# Patient Record
Sex: FEMALE | Race: WHITE | Employment: FULL TIME | URBAN - METROPOLITAN AREA
[De-identification: names, ages, dates, MRNs, and addresses within clinical notes are randomized per-mention and may not be internally consistent; named-entity substitution may affect disease eponyms.]

---

## 2017-09-02 ENCOUNTER — HOSPITAL ENCOUNTER (EMERGENCY)
Age: 42
Discharge: LEFT BEFORE DISCHARGE | End: 2017-09-02
Attending: EMERGENCY MEDICINE
Payer: MEDICAID

## 2017-09-02 VITALS
OXYGEN SATURATION: 95 % | TEMPERATURE: 98.6 F | RESPIRATION RATE: 18 BRPM | SYSTOLIC BLOOD PRESSURE: 142 MMHG | HEART RATE: 101 BPM | DIASTOLIC BLOOD PRESSURE: 73 MMHG

## 2017-09-02 PROCEDURE — 99281 EMR DPT VST MAYX REQ PHY/QHP: CPT | Performed by: EMERGENCY MEDICINE

## 2017-09-03 NOTE — ED NOTES
Went to bedside to attempt to discharge patient, and patient was not in the room. Vital Signs were not able to be obtained for discharge due to the patient's abscence. Patient did not receive discharge instructions and/or prescriptions.

## 2017-11-22 ENCOUNTER — HOSPITAL ENCOUNTER (EMERGENCY)
Age: 42
Discharge: HOME | End: 2017-11-23
Attending: EMERGENCY MEDICINE
Payer: MEDICAID

## 2017-11-22 ENCOUNTER — APPOINTMENT (OUTPATIENT)
Dept: GENERAL RADIOLOGY | Age: 42
End: 2017-11-22
Attending: EMERGENCY MEDICINE
Payer: MEDICAID

## 2017-11-22 DIAGNOSIS — J20.9 ACUTE BRONCHITIS, UNSPECIFIED ORGANISM: Primary | ICD-10-CM

## 2017-11-22 LAB
ALBUMIN SERPL-MCNC: 3.4 G/DL (ref 3.5–5)
ALBUMIN/GLOB SERPL: 0.9 {RATIO} (ref 1.2–3.5)
ALP SERPL-CCNC: 92 U/L (ref 50–136)
ALT SERPL-CCNC: 23 U/L (ref 12–65)
ANION GAP SERPL CALC-SCNC: 11 MMOL/L (ref 7–16)
AST SERPL-CCNC: 20 U/L (ref 15–37)
BILIRUB SERPL-MCNC: 0.2 MG/DL (ref 0.2–1.1)
BUN SERPL-MCNC: 17 MG/DL (ref 6–23)
CALCIUM SERPL-MCNC: 8.7 MG/DL (ref 8.3–10.4)
CHLORIDE SERPL-SCNC: 103 MMOL/L (ref 98–107)
CO2 SERPL-SCNC: 29 MMOL/L (ref 21–32)
CREAT SERPL-MCNC: 1.08 MG/DL (ref 0.6–1)
ERYTHROCYTE [DISTWIDTH] IN BLOOD BY AUTOMATED COUNT: 13.6 % (ref 11.9–14.6)
GLOBULIN SER CALC-MCNC: 3.6 G/DL (ref 2.3–3.5)
GLUCOSE SERPL-MCNC: 128 MG/DL (ref 65–100)
HCT VFR BLD AUTO: 41.4 % (ref 35.8–46.3)
HGB BLD-MCNC: 13.7 G/DL (ref 11.7–15.4)
MCH RBC QN AUTO: 29 PG (ref 26.1–32.9)
MCHC RBC AUTO-ENTMCNC: 33.1 G/DL (ref 31.4–35)
MCV RBC AUTO: 87.7 FL (ref 79.6–97.8)
PLATELET # BLD AUTO: 231 K/UL (ref 150–450)
PMV BLD AUTO: 11.4 FL (ref 10.8–14.1)
POTASSIUM SERPL-SCNC: 3.3 MMOL/L (ref 3.5–5.1)
PROT SERPL-MCNC: 7 G/DL (ref 6.3–8.2)
RBC # BLD AUTO: 4.72 M/UL (ref 4.05–5.25)
SODIUM SERPL-SCNC: 143 MMOL/L (ref 136–145)
WBC # BLD AUTO: 7.4 K/UL (ref 4.3–11.1)

## 2017-11-22 PROCEDURE — 71010 XR CHEST PORT: CPT

## 2017-11-22 PROCEDURE — 93005 ELECTROCARDIOGRAM TRACING: CPT | Performed by: EMERGENCY MEDICINE

## 2017-11-22 PROCEDURE — 83880 ASSAY OF NATRIURETIC PEPTIDE: CPT | Performed by: EMERGENCY MEDICINE

## 2017-11-22 PROCEDURE — 99284 EMERGENCY DEPT VISIT MOD MDM: CPT | Performed by: EMERGENCY MEDICINE

## 2017-11-22 PROCEDURE — 80053 COMPREHEN METABOLIC PANEL: CPT | Performed by: EMERGENCY MEDICINE

## 2017-11-22 PROCEDURE — 74011250637 HC RX REV CODE- 250/637: Performed by: EMERGENCY MEDICINE

## 2017-11-22 PROCEDURE — 85027 COMPLETE CBC AUTOMATED: CPT | Performed by: EMERGENCY MEDICINE

## 2017-11-22 RX ORDER — CLONIDINE HYDROCHLORIDE 0.2 MG/1
TABLET ORAL 2 TIMES DAILY
COMMUNITY

## 2017-11-22 RX ORDER — LISINOPRIL 40 MG/1
40 TABLET ORAL DAILY
COMMUNITY

## 2017-11-22 RX ORDER — SERTRALINE HYDROCHLORIDE 100 MG/1
TABLET, FILM COATED ORAL DAILY
COMMUNITY

## 2017-11-22 RX ORDER — CLONAZEPAM 1 MG/1
TABLET ORAL 2 TIMES DAILY
COMMUNITY

## 2017-11-22 RX ORDER — TRAMADOL HYDROCHLORIDE 50 MG/1
50 TABLET ORAL
Status: COMPLETED | OUTPATIENT
Start: 2017-11-22 | End: 2017-11-22

## 2017-11-22 RX ORDER — PROPRANOLOL HYDROCHLORIDE 80 MG/1
80 TABLET ORAL 3 TIMES DAILY
COMMUNITY

## 2017-11-22 RX ORDER — FUROSEMIDE 20 MG/1
TABLET ORAL DAILY
COMMUNITY

## 2017-11-22 RX ORDER — ATORVASTATIN CALCIUM 40 MG/1
TABLET, FILM COATED ORAL DAILY
COMMUNITY

## 2017-11-22 RX ORDER — VERAPAMIL HYDROCHLORIDE 120 MG/1
120 TABLET, FILM COATED ORAL 3 TIMES DAILY
COMMUNITY

## 2017-11-22 RX ADMIN — TRAMADOL HYDROCHLORIDE 50 MG: 50 TABLET, FILM COATED ORAL at 23:42

## 2017-11-22 NOTE — LETTER
[facility] EMERGENCY DEPT 
[address] [place] [id number] 
[phone] Work/School Note Date: 11/22/2017 To Whom It May concern: 
 
Aydee Oropeza was seen and treated today in the emergency room by the following provider(s): 
Attending Provider: Rosetta Duarte MD.   
 
Aydee Oropeza may return to work on Friday 11/24/17 Sincerely, Kami Zabala RN

## 2017-11-23 VITALS
DIASTOLIC BLOOD PRESSURE: 90 MMHG | WEIGHT: 223 LBS | TEMPERATURE: 98.5 F | RESPIRATION RATE: 18 BRPM | BODY MASS INDEX: 44.96 KG/M2 | OXYGEN SATURATION: 95 % | SYSTOLIC BLOOD PRESSURE: 185 MMHG | HEIGHT: 59 IN | HEART RATE: 95 BPM

## 2017-11-23 LAB
ATRIAL RATE: 96 BPM
BNP SERPL-MCNC: 106 PG/ML
CALCULATED P AXIS, ECG09: 53 DEGREES
CALCULATED R AXIS, ECG10: 69 DEGREES
CALCULATED T AXIS, ECG11: -100 DEGREES
DIAGNOSIS, 93000: NORMAL
P-R INTERVAL, ECG05: 124 MS
Q-T INTERVAL, ECG07: 328 MS
QRS DURATION, ECG06: 82 MS
QTC CALCULATION (BEZET), ECG08: 414 MS
VENTRICULAR RATE, ECG03: 96 BPM

## 2017-11-23 RX ORDER — BENZONATATE 100 MG/1
100-200 CAPSULE ORAL
Qty: 14 CAP | Refills: 0 | Status: SHIPPED | OUTPATIENT
Start: 2017-11-23 | End: 2017-11-30

## 2017-11-23 RX ORDER — CIPROFLOXACIN 500 MG/1
500 TABLET ORAL 2 TIMES DAILY
Qty: 14 TAB | Refills: 0 | Status: SHIPPED | OUTPATIENT
Start: 2017-11-23 | End: 2017-11-30

## 2017-11-23 NOTE — ED PROVIDER NOTES
HPI Comments: 49-year-old lady with a history of potential CHF who presents with concerns about some shortness of breath and some leg swelling. Patient says that she has had no event or chills. She says she has been taking her Lasix and has not missed any doses. She says for she notes she does not have any significant history of COPD. Patient was admitted to the [address] [place] [address] about a month and a half ago for shortness of breath issues and had a nuclear medicine stress test at that time. That was barely negative. Patient says with her shortness of breath she has not had a productive cough. She says she does have some overall chest soreness but no specific pain and says she is not having any heaviness or tightness. Elements of this note were created using speech recognition software. As such, errors of speech recognition may be present. The history is provided by the patient. Past Medical History:   Diagnosis Date    Heart failure ([address]     Hypertension     Psychiatric disorder     anxiety and depression       Past Surgical History:   Procedure Laterality Date    HX CHOLECYSTECTOMY      HX TUBAL LIGATION      c section times 2         History reviewed. No pertinent family history. Social History     Social History    Marital status:      Spouse name: N/A    Number of children: N/A    Years of education: N/A     Occupational History    Not on file. Social History Main Topics    Smoking status: Current Every Day Smoker     Packs/day: 1.00    Smokeless tobacco: Never Used    Alcohol use No    Drug use: No    Sexual activity: Yes     Birth control/ protection: None, Surgical     Other Topics Concern    Not on file     Social History Narrative         ALLERGIES: Meperidine; Nsaids (non-steroidal anti-inflammatory drug); Other medication; and Sulfa (sulfonamide antibiotics)    Review of Systems   Constitutional: Negative for chills, diaphoresis and fever. HENT: Negative for congestion, rhinorrhea and sore throat. Eyes: Negative for redness and visual disturbance. Respiratory: Positive for cough and shortness of breath. Negative for chest tightness and wheezing. Cardiovascular: Positive for leg swelling. Negative for chest pain and palpitations. Gastrointestinal: Negative for abdominal pain, blood in stool, diarrhea, nausea and vomiting. Endocrine: Negative for polydipsia and polyuria. Genitourinary: Negative for dysuria and hematuria. Musculoskeletal: Negative for arthralgias, myalgias and neck stiffness. Skin: Negative for rash. Allergic/Immunologic: Negative for environmental allergies and food allergies. Neurological: Negative for dizziness, weakness and headaches. Hematological: Negative for adenopathy. Does not bruise/bleed easily. Psychiatric/Behavioral: Negative for confusion and sleep disturbance. The patient is not nervous/anxious. Vitals:    11/22/17 2250   BP: (!) 187/102   Pulse: 98   Resp: 18   Temp: 98.5 °F (36.9 °C)   SpO2: 96%   Weight: 101.2 kg (223 lb)   Height: 4' 11\" (1.499 m)            Physical Exam   Constitutional: She is oriented to person, place, and time. She appears well-developed and well-nourished. HENT:   Head: Normocephalic and atraumatic. Eyes: Conjunctivae and EOM are normal. Pupils are equal, round, and reactive to light. Neck: Normal range of motion. Cardiovascular: Normal rate and regular rhythm. Pulmonary/Chest: Effort normal and breath sounds normal. No respiratory distress. She has no wheezes. She has no rales. She exhibits no tenderness. No significant wheezing or rales   Abdominal: Soft. Bowel sounds are normal. There is no rebound and no guarding. Musculoskeletal: Normal range of motion. She exhibits edema. She exhibits no tenderness. 1+ pitting edema   Lymphadenopathy:     She has no cervical adenopathy. Neurological: She is alert and oriented to person, place, and time. Skin: Skin is warm and dry. Psychiatric: She has a normal mood and affect. Nursing note and vitals reviewed. MDM  Number of Diagnoses or Management Options  Diagnosis management comments: Result Narrative  · Resting ECG: normal. Except for non-specific T Wave abnormalities. · Stress ECG: non diagnostic. · Protocol: Pharmacologic using regadenoson  · Hemodynamic Response: adequate. · Symptoms: chest pain, nausea and shortness of breath. Chest pain 9/10;   persisting so gave SL NTG with improvement and resolution in 8-10min. · LV Function: EF is 57%. LV function is normal. The wall motion is   normal.  · Left ventricle cavity size is normal.  · Left ventricular perfusion: normal.  · Findings consistent with no acute reversible nyocardial ischemia seen. .      I will check an x-ray  As well as some basic blood work. Patient's x-ray and blood work essentially unremarkable. I will plan to treat her for a bronchitis given her significant smoking history.     ED Course       Procedures

## 2017-11-23 NOTE — DISCHARGE INSTRUCTIONS
Return with any fevers, vomiting, difficulty breathing, worsening symptoms, or additional concerns. Follow-up with your primary care doctor for reevaluation in 5-7 days.

## 2017-11-23 NOTE — ED NOTES
I have reviewed discharge instructions with the patient. The patient verbalized understanding. Patient left ED via Discharge Method: ambulatory to Home with     Opportunity for questions and clarification provided. Patient given 1 scripts. To continue your aftercare when you leave the hospital, you may receive an automated call from our care team to check in on how you are doing. This is a free service and part of our promise to provide the best care and service to meet your aftercare needs.  If you have questions, or wish to unsubscribe from this service please call [phone]. Thank you for Choosing our [facility] Emergency Department.

## 2017-11-23 NOTE — ED TRIAGE NOTES
Pt.states her lasix is not working and she is swelling and short of breath. Pt.has pitting edema in legs and pt.smells of cigarettes;states \"it was smoke or kill someone. \"Pt.states she has a headache and floaters around eyes.

## 2022-01-19 ENCOUNTER — HOSPITAL ENCOUNTER (EMERGENCY)
Age: 47
Discharge: LEFT BEFORE DISCHARGE | End: 2022-01-19
Attending: EMERGENCY MEDICINE

## 2022-01-19 ENCOUNTER — APPOINTMENT (OUTPATIENT)
Dept: GENERAL RADIOLOGY | Age: 47
End: 2022-01-19
Attending: EMERGENCY MEDICINE

## 2022-01-19 VITALS
BODY MASS INDEX: 47.17 KG/M2 | WEIGHT: 234 LBS | DIASTOLIC BLOOD PRESSURE: 117 MMHG | HEART RATE: 121 BPM | HEIGHT: 59 IN | RESPIRATION RATE: 20 BRPM | SYSTOLIC BLOOD PRESSURE: 207 MMHG | TEMPERATURE: 97.7 F | OXYGEN SATURATION: 98 %

## 2022-01-19 LAB
ALBUMIN SERPL-MCNC: 3.4 G/DL (ref 3.5–5)
ALBUMIN/GLOB SERPL: 0.9 {RATIO} (ref 1.2–3.5)
ALP SERPL-CCNC: 118 U/L (ref 50–136)
ALT SERPL-CCNC: 57 U/L (ref 12–65)
ANION GAP SERPL CALC-SCNC: 6 MMOL/L (ref 7–16)
AST SERPL-CCNC: 29 U/L (ref 15–37)
BASOPHILS # BLD: 0 K/UL (ref 0–0.2)
BASOPHILS NFR BLD: 0 % (ref 0–2)
BILIRUB SERPL-MCNC: 0.4 MG/DL (ref 0.2–1.1)
BNP SERPL-MCNC: 2528 PG/ML (ref 5–125)
BUN SERPL-MCNC: 17 MG/DL (ref 6–23)
CALCIUM SERPL-MCNC: 8.7 MG/DL (ref 8.3–10.4)
CHLORIDE SERPL-SCNC: 107 MMOL/L (ref 98–107)
CO2 SERPL-SCNC: 27 MMOL/L (ref 21–32)
CREAT SERPL-MCNC: 0.83 MG/DL (ref 0.6–1)
CRP SERPL HS-MCNC: 33.2 MG/L
D DIMER PPP FEU-MCNC: 1.2 UG/ML(FEU)
DIFFERENTIAL METHOD BLD: ABNORMAL
EOSINOPHIL # BLD: 0 K/UL (ref 0–0.8)
EOSINOPHIL NFR BLD: 0 % (ref 0.5–7.8)
ERYTHROCYTE [DISTWIDTH] IN BLOOD BY AUTOMATED COUNT: 14.6 % (ref 11.9–14.6)
GLOBULIN SER CALC-MCNC: 4 G/DL (ref 2.3–3.5)
GLUCOSE SERPL-MCNC: 112 MG/DL (ref 65–100)
HCT VFR BLD AUTO: 50 % (ref 35.8–46.3)
HGB BLD-MCNC: 16.3 G/DL (ref 11.7–15.4)
IMM GRANULOCYTES # BLD AUTO: 0 K/UL (ref 0–0.5)
IMM GRANULOCYTES NFR BLD AUTO: 0 % (ref 0–5)
LYMPHOCYTES # BLD: 0.3 K/UL (ref 0.5–4.6)
LYMPHOCYTES NFR BLD: 4 % (ref 13–44)
MAGNESIUM SERPL-MCNC: 2.5 MG/DL (ref 1.8–2.4)
MCH RBC QN AUTO: 28.4 PG (ref 26.1–32.9)
MCHC RBC AUTO-ENTMCNC: 32.6 G/DL (ref 31.4–35)
MCV RBC AUTO: 87.3 FL (ref 79.6–97.8)
MONOCYTES # BLD: 0.3 K/UL (ref 0.1–1.3)
MONOCYTES NFR BLD: 3 % (ref 4–12)
NEUTS SEG # BLD: 8.6 K/UL (ref 1.7–8.2)
NEUTS SEG NFR BLD: 93 % (ref 43–78)
NRBC # BLD: 0 K/UL (ref 0–0.2)
PLATELET # BLD AUTO: 217 K/UL (ref 150–450)
PMV BLD AUTO: 10.8 FL (ref 9.4–12.3)
POTASSIUM SERPL-SCNC: 3.8 MMOL/L (ref 3.5–5.1)
PROCALCITONIN SERPL-MCNC: 0.22 NG/ML (ref 0–0.49)
PROT SERPL-MCNC: 7.4 G/DL (ref 6.3–8.2)
RBC # BLD AUTO: 5.73 M/UL (ref 4.05–5.2)
SODIUM SERPL-SCNC: 140 MMOL/L (ref 136–145)
TROPONIN-HIGH SENSITIVITY: 22.9 PG/ML (ref 0–14)
WBC # BLD AUTO: 9.3 K/UL (ref 4.3–11.1)

## 2022-01-19 PROCEDURE — 80053 COMPREHEN METABOLIC PANEL: CPT

## 2022-01-19 PROCEDURE — 84484 ASSAY OF TROPONIN QUANT: CPT

## 2022-01-19 PROCEDURE — 93005 ELECTROCARDIOGRAM TRACING: CPT | Performed by: EMERGENCY MEDICINE

## 2022-01-19 PROCEDURE — 83880 ASSAY OF NATRIURETIC PEPTIDE: CPT

## 2022-01-19 PROCEDURE — 85025 COMPLETE CBC W/AUTO DIFF WBC: CPT

## 2022-01-19 PROCEDURE — 75810000275 HC EMERGENCY DEPT VISIT NO LEVEL OF CARE

## 2022-01-19 PROCEDURE — 71045 X-RAY EXAM CHEST 1 VIEW: CPT

## 2022-01-19 PROCEDURE — 84145 PROCALCITONIN (PCT): CPT

## 2022-01-19 PROCEDURE — 85379 FIBRIN DEGRADATION QUANT: CPT

## 2022-01-19 PROCEDURE — 86141 C-REACTIVE PROTEIN HS: CPT

## 2022-01-19 PROCEDURE — 83735 ASSAY OF MAGNESIUM: CPT

## 2022-01-19 RX ORDER — SODIUM CHLORIDE 0.9 % (FLUSH) 0.9 %
5-10 SYRINGE (ML) INJECTION EVERY 8 HOURS
Status: DISCONTINUED | OUTPATIENT
Start: 2022-01-19 | End: 2022-01-19 | Stop reason: HOSPADM

## 2022-01-19 RX ORDER — SODIUM CHLORIDE 0.9 % (FLUSH) 0.9 %
5-10 SYRINGE (ML) INJECTION AS NEEDED
Status: DISCONTINUED | OUTPATIENT
Start: 2022-01-19 | End: 2022-01-19 | Stop reason: HOSPADM

## 2022-01-19 NOTE — ED TRIAGE NOTES
Patient arrives ambulatory to triage with mask in place. Patient reports covid positive for 10 days. Reports worsening shortness of breath, chest pain for the past several days.   Reports continued n/v/d.

## 2022-01-20 LAB
ATRIAL RATE: 119 BPM
CALCULATED P AXIS, ECG09: 57 DEGREES
CALCULATED R AXIS, ECG10: -8 DEGREES
CALCULATED T AXIS, ECG11: 164 DEGREES
DIAGNOSIS, 93000: NORMAL
P-R INTERVAL, ECG05: 118 MS
Q-T INTERVAL, ECG07: 340 MS
QRS DURATION, ECG06: 86 MS
QTC CALCULATION (BEZET), ECG08: 478 MS
VENTRICULAR RATE, ECG03: 119 BPM

## 2022-01-21 ENCOUNTER — HOSPITAL ENCOUNTER (EMERGENCY)
Age: 47
Discharge: LEFT BEFORE DISCHARGE | End: 2022-01-21
Payer: COMMERCIAL

## 2022-01-21 VITALS
HEART RATE: 98 BPM | BODY MASS INDEX: 47.17 KG/M2 | DIASTOLIC BLOOD PRESSURE: 87 MMHG | RESPIRATION RATE: 22 BRPM | OXYGEN SATURATION: 98 % | TEMPERATURE: 97.9 F | WEIGHT: 234 LBS | SYSTOLIC BLOOD PRESSURE: 192 MMHG | HEIGHT: 59 IN

## 2022-01-21 PROCEDURE — 75810000275 HC EMERGENCY DEPT VISIT NO LEVEL OF CARE

## 2022-01-21 NOTE — ED TRIAGE NOTES
States on the 10th she was dx with covid and now she is feeling worse and when she coughs it like a metal taste states it hurts to breath

## 2022-01-24 ENCOUNTER — APPOINTMENT (OUTPATIENT)
Dept: GENERAL RADIOLOGY | Age: 47
End: 2022-01-24
Attending: EMERGENCY MEDICINE

## 2022-01-24 LAB
ALBUMIN SERPL-MCNC: 3.3 G/DL (ref 3.5–5)
ALBUMIN/GLOB SERPL: 0.9 {RATIO} (ref 1.2–3.5)
ALP SERPL-CCNC: 103 U/L (ref 50–136)
ALT SERPL-CCNC: 69 U/L (ref 12–65)
ANION GAP SERPL CALC-SCNC: 3 MMOL/L (ref 7–16)
AST SERPL-CCNC: 42 U/L (ref 15–37)
BASOPHILS # BLD: 0 K/UL (ref 0–0.2)
BASOPHILS NFR BLD: 0 % (ref 0–2)
BILIRUB SERPL-MCNC: 0.3 MG/DL (ref 0.2–1.1)
BUN SERPL-MCNC: 14 MG/DL (ref 6–23)
CALCIUM SERPL-MCNC: 8.8 MG/DL (ref 8.3–10.4)
CHLORIDE SERPL-SCNC: 109 MMOL/L (ref 98–107)
CO2 SERPL-SCNC: 30 MMOL/L (ref 21–32)
CREAT SERPL-MCNC: 0.91 MG/DL (ref 0.6–1)
DIFFERENTIAL METHOD BLD: ABNORMAL
EOSINOPHIL # BLD: 0.1 K/UL (ref 0–0.8)
EOSINOPHIL NFR BLD: 1 % (ref 0.5–7.8)
ERYTHROCYTE [DISTWIDTH] IN BLOOD BY AUTOMATED COUNT: 14.7 % (ref 11.9–14.6)
GLOBULIN SER CALC-MCNC: 3.5 G/DL (ref 2.3–3.5)
GLUCOSE SERPL-MCNC: 93 MG/DL (ref 65–100)
HCT VFR BLD AUTO: 53.8 % (ref 35.8–46.3)
HGB BLD-MCNC: 17.4 G/DL (ref 11.7–15.4)
IMM GRANULOCYTES # BLD AUTO: 0 K/UL (ref 0–0.5)
IMM GRANULOCYTES NFR BLD AUTO: 0 % (ref 0–5)
LIPASE SERPL-CCNC: 112 U/L (ref 73–393)
LYMPHOCYTES # BLD: 2 K/UL (ref 0.5–4.6)
LYMPHOCYTES NFR BLD: 25 % (ref 13–44)
MAGNESIUM SERPL-MCNC: 2.3 MG/DL (ref 1.8–2.4)
MCH RBC QN AUTO: 28.2 PG (ref 26.1–32.9)
MCHC RBC AUTO-ENTMCNC: 32.3 G/DL (ref 31.4–35)
MCV RBC AUTO: 87.2 FL (ref 79.6–97.8)
MONOCYTES # BLD: 0.5 K/UL (ref 0.1–1.3)
MONOCYTES NFR BLD: 7 % (ref 4–12)
NEUTS SEG # BLD: 5.1 K/UL (ref 1.7–8.2)
NEUTS SEG NFR BLD: 66 % (ref 43–78)
NRBC # BLD: 0 K/UL (ref 0–0.2)
PLATELET # BLD AUTO: 256 K/UL (ref 150–450)
PMV BLD AUTO: 11 FL (ref 9.4–12.3)
POTASSIUM SERPL-SCNC: 4.1 MMOL/L (ref 3.5–5.1)
PROT SERPL-MCNC: 6.8 G/DL (ref 6.3–8.2)
RBC # BLD AUTO: 6.17 M/UL (ref 4.05–5.2)
SODIUM SERPL-SCNC: 142 MMOL/L (ref 136–145)
TROPONIN-HIGH SENSITIVITY: 15.9 PG/ML (ref 0–14)
TROPONIN-HIGH SENSITIVITY: 17.5 PG/ML (ref 0–14)
WBC # BLD AUTO: 7.7 K/UL (ref 4.3–11.1)

## 2022-01-24 PROCEDURE — 85025 COMPLETE CBC W/AUTO DIFF WBC: CPT

## 2022-01-24 PROCEDURE — 83735 ASSAY OF MAGNESIUM: CPT

## 2022-01-24 PROCEDURE — 84484 ASSAY OF TROPONIN QUANT: CPT

## 2022-01-24 PROCEDURE — 93005 ELECTROCARDIOGRAM TRACING: CPT | Performed by: EMERGENCY MEDICINE

## 2022-01-24 PROCEDURE — 83690 ASSAY OF LIPASE: CPT

## 2022-01-24 PROCEDURE — 71046 X-RAY EXAM CHEST 2 VIEWS: CPT

## 2022-01-24 PROCEDURE — 80053 COMPREHEN METABOLIC PANEL: CPT

## 2022-01-24 PROCEDURE — 99284 EMERGENCY DEPT VISIT MOD MDM: CPT

## 2022-01-24 RX ORDER — SODIUM CHLORIDE 0.9 % (FLUSH) 0.9 %
5-10 SYRINGE (ML) INJECTION EVERY 8 HOURS
Status: DISCONTINUED | OUTPATIENT
Start: 2022-01-24 | End: 2022-01-25 | Stop reason: HOSPADM

## 2022-01-24 RX ORDER — SODIUM CHLORIDE 0.9 % (FLUSH) 0.9 %
5-10 SYRINGE (ML) INJECTION AS NEEDED
Status: DISCONTINUED | OUTPATIENT
Start: 2022-01-24 | End: 2022-01-25 | Stop reason: HOSPADM

## 2022-01-24 NOTE — ED TRIAGE NOTES
Patient presents with complaints of positive covid. Patient states that she tested positive for covid on 1.10. patient denies covid vaccination. Patient states that over the past 3 days she has been having chest pain and shortness of breath with indigestion like feeling. Patient with history of CAD, and CHF. Patient o2 sat 98% on RA. Patient states that she has had \"2 mild heart attacks.  \"

## 2022-01-25 ENCOUNTER — HOSPITAL ENCOUNTER (EMERGENCY)
Age: 47
Discharge: HOME | End: 2022-01-25
Attending: EMERGENCY MEDICINE

## 2022-01-25 VITALS
SYSTOLIC BLOOD PRESSURE: 152 MMHG | HEIGHT: 59 IN | RESPIRATION RATE: 18 BRPM | WEIGHT: 234 LBS | HEART RATE: 85 BPM | DIASTOLIC BLOOD PRESSURE: 76 MMHG | BODY MASS INDEX: 47.17 KG/M2 | OXYGEN SATURATION: 96 % | TEMPERATURE: 98.3 F

## 2022-01-25 DIAGNOSIS — U07.1 COVID-19: Primary | ICD-10-CM

## 2022-01-25 LAB
ATRIAL RATE: 82 BPM
CALCULATED P AXIS, ECG09: 32 DEGREES
CALCULATED R AXIS, ECG10: 38 DEGREES
CALCULATED T AXIS, ECG11: -142 DEGREES
DIAGNOSIS, 93000: NORMAL
P-R INTERVAL, ECG05: 124 MS
Q-T INTERVAL, ECG07: 414 MS
QRS DURATION, ECG06: 84 MS
QTC CALCULATION (BEZET), ECG08: 483 MS
VENTRICULAR RATE, ECG03: 82 BPM

## 2022-01-25 RX ORDER — ONDANSETRON 4 MG/1
4 TABLET, ORALLY DISINTEGRATING ORAL
Qty: 8 TABLET | Refills: 2 | Status: SHIPPED | OUTPATIENT
Start: 2022-01-25

## 2022-01-25 NOTE — ED NOTES
Pt to er c/o having sob since she found out she had covid on jan 10th.   Pt c/o chest pain and cough also

## 2022-01-25 NOTE — Clinical Note
[facility] EMERGENCY DEPT   [facility] [id number] [phone]    Work/School Note    Date: 1/24/2022     To Whom It May concern:    Katharine Ross was evaluated by the following provider(s):  Attending Provider: Clifford Calvo MD.   Douglasael Bamberger virus is suspected. Per the CDC guidelines we recommend home isolation until the following conditions are all met:    1. At least five days have passed since symptoms first appeared and/or had a close exposure,   2. After home isolation for five days, wearing a mask around others for the next five days,  3. At least 24 have passed since last fever without the use of fever-reducing medications and  4. Symptoms (eg cough, shortness of breath) have improved    You may go back to work as long as you are not running a fever. You need to wear a tight fitting N95 mask until your symptoms resolve.   Sincerely,          Neli Lopez RN

## 2022-01-25 NOTE — DISCHARGE INSTRUCTIONS
Follow up with one of the doctors listed. Return to the ER if your symptoms worsen.     [address] [facility] [address]  [phone]    [address] [id number] [phone]

## 2022-01-25 NOTE — Clinical Note
[address] [facility] EMERGENCY DEPT   [facility] [address] [phone]    Work/School Note    Date: 1/24/2022     To Whom It May concern:    Leticia Georges was evaluated by the following provider(s):  Attending Provider: Narciso Diggs MD.   Cheryln Grad virus is suspected. Per the CDC guidelines we recommend home isolation until the following conditions are all met:    1. At least five days have passed since symptoms first appeared and/or had a close exposure,   2. After home isolation for five days, wearing a mask around others for the next five days,  3. At least 24 have passed since last fever without the use of fever-reducing medications and  4. Symptoms (eg cough, shortness of breath) have improved    You may go back to work as long as you are not running a fever. You need to wear a tight fitting N95 mask until your symptoms resolve.   Sincerely,          Suresh Edmonds MD

## 2022-01-25 NOTE — ED PROVIDER NOTES
Patient is a 49-year-old female with a history of CHF, hypertension and anxiety who tested positive for COVID-19 on January 10. Her symptoms started on January 1. Specifically, she has had clear rhinorrhea with vomiting and diarrhea and a dry cough. She has not been vaccinated for Covid. She also has been having episodes of chest pain. This happens after she coughs. She describes as left-sided sharp pain with occasional radiation to her back. The pain gradually resolves. She has not taken any medicine for symptoms. She denies any lower extremity swelling. She gets some occasional mild dyspnea but states otherwise her breathing has been at her baseline. Past Medical History:   Diagnosis Date    Heart failure ([address]     Hypertension     Psychiatric disorder     anxiety and depression       Past Surgical History:   Procedure Laterality Date    HX CHOLECYSTECTOMY      HX TUBAL LIGATION      c section times 2         History reviewed. No pertinent family history. Social History     Socioeconomic History    Marital status:      Spouse name: Not on file    Number of children: Not on file    Years of education: Not on file    Highest education level: Not on file   Occupational History    Not on file   Tobacco Use    Smoking status: Current Every Day Smoker     Packs/day: 1.00    Smokeless tobacco: Never Used   Substance and Sexual Activity    Alcohol use: No    Drug use: No    Sexual activity: Yes     Birth control/protection: None, Surgical   Other Topics Concern    Not on file   Social History Narrative    Not on file     Social Determinants of Health     Financial Resource Strain:     Difficulty of Paying Living Expenses: Not on file   Food Insecurity:     Worried About Running Out of Food in the Last Year: Not on file    Calvin of Food in the Last Year: Not on file   Transportation Needs:     Lack of Transportation (Medical):  Not on file    Lack of Transportation (Non-Medical): Not on file   Physical Activity:     Days of Exercise per Week: Not on file    Minutes of Exercise per Session: Not on file   Stress:     Feeling of Stress : Not on file   Social Connections:     Frequency of Communication with Friends and Family: Not on file    Frequency of Social Gatherings with Friends and Family: Not on file    Attends [employer] Services: Not on file    Active Member of [address] [place] [address] or Organizations: Not on file    Attends Club or Organization Meetings: Not on file    Marital Status: Not on file   Intimate Partner Violence:     Fear of Current or Ex-Partner: Not on file    Emotionally Abused: Not on file    Physically Abused: Not on file    Sexually Abused: Not on file   Housing Stability:     Unable to Pay for Housing in the Last Year: Not on file    Number of [place] in the Last Year: Not on file    Unstable Housing in the Last Year: Not on file         ALLERGIES: Cephalexin, Meperidine, Nsaids (non-steroidal anti-inflammatory drug), Other medication, and Sulfa (sulfonamide antibiotics)    Review of Systems   Constitutional: Negative for chills and fever. Respiratory: Positive for cough. Gastrointestinal: Positive for diarrhea, nausea and vomiting. All other systems reviewed and are negative. Vitals:    01/25/22 0245 01/25/22 0247 01/25/22 0252 01/25/22 0255   BP: (!) 152/76      Pulse:       Resp:       Temp:       SpO2: 95% 98% 96% 96%   Weight:       Height:                Physical Exam  Vitals and nursing note reviewed. Constitutional:       Appearance: Normal appearance. She is well-developed. HENT:      Head: Normocephalic and atraumatic. Eyes:      Conjunctiva/sclera: Conjunctivae normal.      Pupils: Pupils are equal, round, and reactive to light. Cardiovascular:      Rate and Rhythm: Normal rate and regular rhythm. Pulmonary:      Effort: Pulmonary effort is normal. No respiratory distress. Breath sounds: No wheezing or rales. Abdominal:      General: There is no distension. Palpations: Abdomen is soft. Musculoskeletal:         General: No tenderness. Cervical back: Normal range of motion and neck supple. Right lower leg: No edema. Left lower leg: No edema. Skin:     General: Skin is warm and dry. Neurological:      Mental Status: She is alert and oriented to person, place, and time. Psychiatric:         Behavior: Behavior normal.          MDM  Number of Diagnoses or Management Options  COVID-19: new and does not require workup  Diagnosis management comments: 3:26 AM discussed results with patient, unremarkable work-up. Low clinical suspicion for PE, PERC negative. EKG G shows nonspecific changes which are unchanged from 5 days ago. EKG from 11/22/2017 showed a similar pattern though less severe.        Amount and/or Complexity of Data Reviewed  Clinical lab tests: reviewed and ordered  Tests in the radiology section of CPT®: ordered and reviewed  Tests in the medicine section of CPT®: reviewed and ordered    Risk of Complications, Morbidity, and/or Mortality  Presenting problems: moderate  Diagnostic procedures: moderate  Management options: moderate    Patient Progress  Patient progress: stable         EKG    Date/Time: 1/25/2022 3:27 AM  Performed by: Rodo Mace MD  Authorized by: Rodo Mace MD     ECG reviewed by ED Physician in the absence of a cardiologist: yes    Previous ECG:     Previous ECG:  Compared to current    Comparison ECG info:  1/19/22    Similarity:  No change  Interpretation:     Interpretation: non-specific    Rate:     ECG rate:  82    ECG rate assessment: normal    Rhythm:     Rhythm: sinus rhythm    Ectopy:     Ectopy: none    QRS:     QRS axis:  Normal  Conduction:     Conduction: normal    ST segments:     ST segments:  Normal  T waves:     T waves: inverted      Inverted:  I, II, aVL, V4, V5 and V6

## 2022-01-26 ENCOUNTER — PATIENT OUTREACH (OUTPATIENT)
Dept: CASE MANAGEMENT | Age: 47
End: 2022-01-26

## 2022-01-26 NOTE — PROGRESS NOTES
Date/Time:  1/26/2022 9:15 AM   Call within 2 business days of discharge: Yes   Attempted to reach patient by telephone. Left HIPPA compliant message requesting a return call. Will attempt to reach patient again.

## 2022-01-27 ENCOUNTER — PATIENT OUTREACH (OUTPATIENT)
Dept: CASE MANAGEMENT | Age: 47
End: 2022-01-27

## 2022-01-27 NOTE — PROGRESS NOTES
Date/Time:  1/27/2022 8:30 AM   Call within 2 business days of discharge: Yes   Attempted to reach patient by telephone. Left HIPPA compliant message requesting a return call. Home number is a hotel. Episode resolved due to unable to reach patient x 2 attempts.

## 2022-08-23 ENCOUNTER — HOSPITAL ENCOUNTER (EMERGENCY)
Dept: GENERAL RADIOLOGY | Age: 47
Discharge: HOME | End: 2022-08-26
Payer: COMMERCIAL

## 2022-08-23 ENCOUNTER — HOSPITAL ENCOUNTER (EMERGENCY)
Age: 47
Discharge: HOME | End: 2022-08-24
Attending: EMERGENCY MEDICINE
Payer: COMMERCIAL

## 2022-08-23 DIAGNOSIS — I10 HYPERTENSION, UNSPECIFIED TYPE: ICD-10-CM

## 2022-08-23 DIAGNOSIS — R07.9 CHEST PAIN, UNSPECIFIED TYPE: Primary | ICD-10-CM

## 2022-08-23 LAB
ERYTHROCYTE [DISTWIDTH] IN BLOOD BY AUTOMATED COUNT: 15.3 % (ref 11.9–14.6)
HCT VFR BLD AUTO: 50.5 % (ref 35.8–46.3)
HGB BLD-MCNC: 16.5 G/DL (ref 11.7–15.4)
MCH RBC QN AUTO: 29.3 PG (ref 26.1–32.9)
MCHC RBC AUTO-ENTMCNC: 32.7 G/DL (ref 31.4–35)
MCV RBC AUTO: 89.7 FL (ref 79.6–97.8)
NRBC # BLD: 0 K/UL (ref 0–0.2)
NT PRO BNP: 1114 PG/ML (ref 5–125)
PLATELET # BLD AUTO: 241 K/UL (ref 150–450)
PMV BLD AUTO: 11.3 FL (ref 9.4–12.3)
RBC # BLD AUTO: 5.63 M/UL (ref 4.05–5.2)
TROPONIN I SERPL HS-MCNC: 24.9 PG/ML (ref 0–14)
WBC # BLD AUTO: 8 K/UL (ref 4.3–11.1)

## 2022-08-23 PROCEDURE — 6370000000 HC RX 637 (ALT 250 FOR IP): Performed by: EMERGENCY MEDICINE

## 2022-08-23 PROCEDURE — 84484 ASSAY OF TROPONIN QUANT: CPT

## 2022-08-23 PROCEDURE — 85027 COMPLETE CBC AUTOMATED: CPT

## 2022-08-23 PROCEDURE — 83735 ASSAY OF MAGNESIUM: CPT

## 2022-08-23 PROCEDURE — 71046 X-RAY EXAM CHEST 2 VIEWS: CPT

## 2022-08-23 PROCEDURE — 80053 COMPREHEN METABOLIC PANEL: CPT

## 2022-08-23 PROCEDURE — 83690 ASSAY OF LIPASE: CPT

## 2022-08-23 PROCEDURE — 99285 EMERGENCY DEPT VISIT HI MDM: CPT

## 2022-08-23 PROCEDURE — 83880 ASSAY OF NATRIURETIC PEPTIDE: CPT

## 2022-08-23 PROCEDURE — 93005 ELECTROCARDIOGRAM TRACING: CPT | Performed by: EMERGENCY MEDICINE

## 2022-08-23 RX ORDER — CLONIDINE HYDROCHLORIDE 0.2 MG/1
0.2 TABLET ORAL
Status: COMPLETED | OUTPATIENT
Start: 2022-08-23 | End: 2022-08-24

## 2022-08-23 RX ORDER — ACETAMINOPHEN 500 MG
1000 TABLET ORAL
Status: COMPLETED | OUTPATIENT
Start: 2022-08-24 | End: 2022-08-24

## 2022-08-23 RX ORDER — NITROGLYCERIN 0.4 MG/1
0.4 TABLET SUBLINGUAL
Status: COMPLETED | OUTPATIENT
Start: 2022-08-23 | End: 2022-08-23

## 2022-08-23 RX ADMIN — NITROGLYCERIN 0.4 MG: 0.4 TABLET, ORALLY DISINTEGRATING SUBLINGUAL at 22:45

## 2022-08-23 ASSESSMENT — PAIN DESCRIPTION - ORIENTATION
ORIENTATION: MID
ORIENTATION: MID

## 2022-08-23 ASSESSMENT — PAIN DESCRIPTION - LOCATION
LOCATION: CHEST
LOCATION: CHEST

## 2022-08-23 ASSESSMENT — PAIN - FUNCTIONAL ASSESSMENT: PAIN_FUNCTIONAL_ASSESSMENT: 0-10

## 2022-08-23 ASSESSMENT — PAIN DESCRIPTION - DESCRIPTORS
DESCRIPTORS: PRESSURE
DESCRIPTORS: PRESSURE

## 2022-08-23 ASSESSMENT — PAIN SCALES - GENERAL
PAINLEVEL_OUTOF10: 8
PAINLEVEL_OUTOF10: 6

## 2022-08-23 NOTE — Clinical Note
Ilda Cabrera was seen and treated in our emergency department on 8/23/2022. She may return to work on 08/25/2022. If you have any questions or concerns, please don't hesitate to call.       Laura Chaparro MD

## 2022-08-23 NOTE — Clinical Note
Keyshawn Landa was seen and treated in our emergency department on 8/23/2022. She may return to work on 08/25/2022. If you have any questions or concerns, please don't hesitate to call.       Belkis Nick MD

## 2022-08-23 NOTE — ED TRIAGE NOTES
Pt ambulatory to triage. Pt reports she has had midsternal chest pressure since Sunday night, pt states the pain has been intermittent since Sunday but it has been constant for the past 4 hours. Pt took one SL Nitro about 3 hours ago and pt had relief for about one hour. Pt has some shob, nausea and dizziness with the chest pain today. Pt states her last heart attack was in 2018, denies cardiac stents, states she has just had her heart vessel ballooned and opened that way. Pt is a current 1/2PPD smoker. Pt denies abd pain, fever/chills, vomiting/diarrhea, bodyaches.

## 2022-08-23 NOTE — ED PROVIDER NOTES
[facility] Emergency Department Provider Note                   PCP:                No primary care provider on file. Age: 50 y.o. Sex: female       ICD-10-CM    1. Chest pain, unspecified type  R07.9       2. Hypertension, unspecified type  I10           DISPOSITION Decision To Discharge 08/24/2022 01:15:18 AM       MDM  Number of Diagnoses or Management Options  Chest pain, unspecified type  Hypertension, unspecified type  Diagnosis management comments: I wore appropriate PPE throughout this patient's ED visit. Laura Chaparro MD, 7:42 PM      1:16 AM  BNP elevated, likely from chronic hypertensive heart disease and CHF. No increase in delta troponin. Advised importance of close cardiology follow-up. Patient states after her left MI in 2018 in [place], they changed all of her blood pressure medications. She states the took her off of clonidine. She is now only taking lisinopril and Coreg. She states her blood pressures have been chronically uncontrolled. Referred to [facility] cardiology and prescribed clonidine.        Amount and/or Complexity of Data Reviewed  Clinical lab tests: ordered and reviewed  Tests in the radiology section of CPT®: ordered and reviewed  Tests in the medicine section of CPT®: ordered and reviewed  Review and summarize past medical records: yes  Independent visualization of images, tracings, or specimens: yes         Orders Placed This Encounter   Procedures    XR CHEST (2 VW)    CBC    Troponin    Brain Natriuretic Peptide    Lipase    Magnesium    Comprehensive Metabolic Panel    Troponin    Cardiac Monitor    Pulse Oximetry    EKG 12 Lead    Saline lock IV        Ilda Cabrera is a 50 y.o. female who presents to the Emergency Department with chief complaint of    Chief Complaint   Patient presents with    Chest Pain      40-year-old female smoker with history of coronary artery disease, congestive heart failure, hypertension, anxiety and depression presents with intermittent episodes of left-sided chest pain for the past 3 days. She took 1 nitroglycerin today with some improvement. She states pain radiates to left arm and neck. Pain is worse with exertion. She reports associated shortness of breath and sweating with generalized weakness. She denies nausea, vomiting, diarrhea, or cough. She reports recently moving here and has not found a local cardiologist.        All other systems reviewed and are negative. Review of Systems    Past Medical History:   Diagnosis Date    Heart failure ([address]     Hypertension     Psychiatric disorder     anxiety and depression        Past Surgical History:   Procedure Laterality Date    CHOLECYSTECTOMY      TUBAL LIGATION      c section times 2        No family history on file. Social History     Socioeconomic History    Marital status:    Tobacco Use    Smoking status: Every Day     Packs/day: 1.00     Types: Cigarettes    Smokeless tobacco: Never   Substance and Sexual Activity    Alcohol use: No    Drug use: No        Allergies: Meperidine, Sulfa antibiotics, and Cephalexin    Current Discharge Medication List        CONTINUE these medications which have NOT CHANGED    Details   atorvastatin (LIPITOR) 40 MG tablet Take by mouth daily      clonazePAM (KLONOPIN) 1 MG tablet Take by mouth 2 times daily. furosemide (LASIX) 20 MG tablet Take by mouth daily      lisinopril (PRINIVIL;ZESTRIL) 40 MG tablet Take 40 mg by mouth daily      ondansetron (ZOFRAN-ODT) 4 MG disintegrating tablet Take 4 mg by mouth every 8 hours as needed      propranolol (INDERAL) 80 MG tablet Take 80 mg by mouth 3 times daily      sertraline (ZOLOFT) 100 MG tablet Take by mouth daily      verapamil (CALAN) 120 MG tablet Take 120 mg by mouth 3 times daily              Vitals signs and nursing note reviewed.    Patient Vitals for the past 4 hrs:   Pulse Resp BP SpO2   08/24/22 0014 72 14 (!) 181/103 95 %   08/24/22 0011 -- -- (!) 181/103 -- 08/23/22 2200 82 10 (!) 192/103 98 %          Physical Exam     Procedures    ED EKG Interpretation  EKG was interpreted in the absence of a cardiologist.    Rate: Rate: Normal  EKG Interpretation: EKG Interpretation: sinus rhythm  ST Segments: Nonspecific ST segments - NO STEMI, LVH with repolarization abnormality    Labs Reviewed   CBC - Abnormal; Notable for the following components:       Result Value    RBC 5.63 (*)     Hemoglobin 16.5 (*)     Hematocrit 50.5 (*)     RDW 15.3 (*)     All other components within normal limits   TROPONIN - Abnormal; Notable for the following components:    Troponin, High Sensitivity 24.9 (*)     All other components within normal limits   BRAIN NATRIURETIC PEPTIDE - Abnormal; Notable for the following components:    NT Pro-BNP 1,114 (*)     All other components within normal limits   COMPREHENSIVE METABOLIC PANEL - Abnormal; Notable for the following components:    Chloride 110 (*)     Anion Gap 5 (*)     Albumin 3.4 (*)     Globulin 3.8 (*)     Albumin/Globulin Ratio 0.9 (*)     All other components within normal limits   TROPONIN - Abnormal; Notable for the following components:    Troponin, High Sensitivity 21.9 (*)     All other components within normal limits   LIPASE   MAGNESIUM        XR CHEST (2 VW)   Final Result   No consolidation. Voice dictation software was used during the making of this note. This software is not perfect and grammatical and other typographical errors may be present. This note has not been completely proofread for errors.      Alfred Lerma MD  08/24/22 1585

## 2022-08-24 VITALS
SYSTOLIC BLOOD PRESSURE: 176 MMHG | HEART RATE: 77 BPM | DIASTOLIC BLOOD PRESSURE: 98 MMHG | RESPIRATION RATE: 14 BRPM | OXYGEN SATURATION: 96 % | TEMPERATURE: 98.5 F

## 2022-08-24 LAB
ALBUMIN SERPL-MCNC: 3.4 G/DL (ref 3.5–5)
ALBUMIN/GLOB SERPL: 0.9 {RATIO} (ref 1.2–3.5)
ALP SERPL-CCNC: 98 U/L (ref 50–136)
ALT SERPL-CCNC: 24 U/L (ref 12–65)
ANION GAP SERPL CALC-SCNC: 5 MMOL/L (ref 7–16)
AST SERPL-CCNC: 23 U/L (ref 15–37)
BILIRUB SERPL-MCNC: 0.2 MG/DL (ref 0.2–1.1)
BUN SERPL-MCNC: 12 MG/DL (ref 6–23)
CALCIUM SERPL-MCNC: 8.7 MG/DL (ref 8.3–10.4)
CHLORIDE SERPL-SCNC: 110 MMOL/L (ref 98–107)
CO2 SERPL-SCNC: 26 MMOL/L (ref 21–32)
CREAT SERPL-MCNC: 0.8 MG/DL (ref 0.6–1)
EKG ATRIAL RATE: 74 BPM
EKG DIAGNOSIS: NORMAL
EKG P AXIS: 9 DEGREES
EKG P-R INTERVAL: 130 MS
EKG Q-T INTERVAL: 432 MS
EKG QRS DURATION: 84 MS
EKG QTC CALCULATION (BAZETT): 479 MS
EKG R AXIS: -3 DEGREES
EKG T AXIS: 179 DEGREES
EKG VENTRICULAR RATE: 74 BPM
GLOBULIN SER CALC-MCNC: 3.8 G/DL (ref 2.3–3.5)
GLUCOSE SERPL-MCNC: 88 MG/DL (ref 65–100)
LIPASE SERPL-CCNC: 87 U/L (ref 73–393)
MAGNESIUM SERPL-MCNC: 2.3 MG/DL (ref 1.8–2.4)
POTASSIUM SERPL-SCNC: 3.7 MMOL/L (ref 3.5–5.1)
PROT SERPL-MCNC: 7.2 G/DL (ref 6.3–8.2)
SODIUM SERPL-SCNC: 141 MMOL/L (ref 136–145)
TROPONIN I SERPL HS-MCNC: 21.9 PG/ML (ref 0–14)

## 2022-08-24 PROCEDURE — 6370000000 HC RX 637 (ALT 250 FOR IP): Performed by: EMERGENCY MEDICINE

## 2022-08-24 PROCEDURE — 36415 COLL VENOUS BLD VENIPUNCTURE: CPT

## 2022-08-24 PROCEDURE — 84484 ASSAY OF TROPONIN QUANT: CPT

## 2022-08-24 RX ORDER — CLONIDINE HYDROCHLORIDE 0.2 MG/1
0.2 TABLET ORAL 2 TIMES DAILY
Qty: 60 TABLET | Refills: 0 | Status: SHIPPED | OUTPATIENT
Start: 2022-08-24

## 2022-08-24 RX ADMIN — ACETAMINOPHEN 1000 MG: 500 TABLET, FILM COATED ORAL at 00:11

## 2022-08-24 RX ADMIN — CLONIDINE HYDROCHLORIDE 0.2 MG: 0.2 TABLET ORAL at 00:11

## 2022-08-24 ASSESSMENT — PAIN SCALES - GENERAL: PAINLEVEL_OUTOF10: 7

## 2022-08-24 NOTE — ED NOTES
Pt placed on SBT 5/5, FiO2 35%  RSBI=32             09/11/21 0819   Vent Information   Vt Ordered 450 mL   Rate Set 0 bmp   Peak Flow 75 L/min   Pressure Support 5 cmH20   FiO2  35 %   SpO2 100 %   SpO2/FiO2 ratio 285.71   Sensitivity 3   PEEP/CPAP 5   I Time/ I Time % 0 s   Vent Patient Data   High Peep/I Pressure 0   Peak Inspiratory Pressure 14 cmH2O   Mean Airway Pressure 9.1 cmH20   Rate Measured 25 br/min   Vt Exhaled 822 mL   Minute Volume 17.5 Liters   I:E Ratio 1:8.30   Spontaneous Breathing Trial (SBT) RT Doc   Pulse 117   Additional Respiratory  Assessments   Resp 21   Alarm Settings   High Pressure Alarm 40 cmH2O   Low Minute Volume Alarm 2.5 L/min   High Respiratory Rate 45 br/min Report given to DAWIT Snow. Care of pt transferred at this time.      Lupis Elliott RN  08/23/22 [id number]

## 2022-08-24 NOTE — ED NOTES
Pt given discharge teaching and instruction, was given the opportunity to ask questions, and verbalized understanding. Pt left ambulatory w/ belongings to the exit.      Willi Guardado RN  08/24/22 6088

## 2022-08-24 NOTE — DISCHARGE INSTRUCTIONS
Monitor blood pressure closely. Follow-up with cardiology for further testing. Return for worsening or concerning symptoms.

## 2023-01-04 NOTE — PROGRESS NOTES
[facility] CARDIOLOGY History & Physical                 Reason for Visit: Establish care    Subjective:     Patient is a 50 y.o. female with a PMH of nonobstructive CAD and hypertension who presents as a referral to establish care. The patient had an [address] in July 2018 that was noted to demonstrate nonobstructive CAD. The patient had a TTE in July 2018 that was noted to demonstrate a normal EF. The patient denies angina. She reports chronic dyspnea on exertion that preceded her LHC in 2018. She does report dry mouth on clonidine. The patient reports that she was taking hydrochlorothiazide and lisinopril when she visited the ED in August 2022. At that time, her renal function and potassium level was normal.  She reports having a sleep study in 2016 and denies snoring. Past Medical History:   Diagnosis Date    Heart failure ([address]     Hypertension     Psychiatric disorder     anxiety and depression      Past Surgical History:   Procedure Laterality Date    CHOLECYSTECTOMY      TUBAL LIGATION      c section times 2      No family history on file. Social History     Tobacco Use    Smoking status: Every Day     Packs/day: 1.00     Types: Cigarettes    Smokeless tobacco: Never   Substance Use Topics    Alcohol use: No      Allergies   Allergen Reactions    Meperidine Hives    Sulfa Antibiotics Hives    Cephalexin Rash     Rash         ROS:  No obvious pertinent positives on review of systems except for what was outlined above.        Objective:       /84   Pulse 62   Ht 4' 11\" (1.499 m)   Wt 227 lb (103 kg)   BMI 45.85 kg/m²     BP Readings from Last 3 Encounters:   01/05/23 136/84   08/24/22 (!) 176/98       Wt Readings from Last 3 Encounters:   01/05/23 227 lb (103 kg)       General/Constitutional:   Alert and oriented x 3, no acute distress  HEENT:   normocephalic, atraumatic, moist mucous membranes  Neck:   No JVD or carotid bruits bilaterally  Cardiovascular:   regular rate and rhythm, no rub/gallop appreciated  Pulmonary:   clear to auscultation bilaterally, no respiratory distress  Abdomen:   soft, non-tender, non-distended  Ext:   No sig LE edema bilaterally  Skin:  warm and dry, no obvious rashes seen  Neuro:   no obvious sensory or motor deficits  Psychiatric:   normal mood and affect    Data Review:   No results found for: CHOL  No results found for: TRIG  No results found for: HDL  No results found for: LDLCHOLESTEROL, LDLCALC  No results found for: LABVLDL, VLDL  No results found for: [facility]     Lab Results   Component Value Date/Time     08/23/2022 09:06 PM     01/24/2022 06:23 PM     01/19/2022 04:55 PM    K 3.7 08/23/2022 09:06 PM    K 4.1 01/24/2022 06:23 PM    K 3.8 01/19/2022 04:55 PM     08/23/2022 09:06 PM     01/24/2022 06:23 PM     01/19/2022 04:55 PM    CO2 26 08/23/2022 09:06 PM    CO2 30 01/24/2022 06:23 PM    CO2 27 01/19/2022 04:55 PM    BUN 12 08/23/2022 09:06 PM    BUN 14 01/24/2022 06:23 PM    BUN 17 01/19/2022 04:55 PM    CREATININE 0.80 08/23/2022 09:06 PM    CREATININE 0.91 01/24/2022 06:23 PM    CREATININE 0.83 01/19/2022 04:55 PM    GLUCOSE 88 08/23/2022 09:06 PM    GLUCOSE 93 01/24/2022 06:23 PM    GLUCOSE 112 01/19/2022 04:55 PM    CALCIUM 8.7 08/23/2022 09:06 PM    CALCIUM 8.8 01/24/2022 06:23 PM    CALCIUM 8.7 01/19/2022 04:55 PM         Lab Results   Component Value Date    ALT 24 08/23/2022    ALT 69 (H) 01/24/2022    ALT 57 01/19/2022    AST 23 08/23/2022    AST 42 (H) 01/24/2022    AST 29 01/19/2022        Assessment/Plan:   1. Healthcare maintenance  - Refer to internal medicine    2. Chronic dyspnea  - [facility] in 2018 noted nonobstructive CAD  - Obtain an echocardiogram     3. CAD in native artery  - Continue with baby aspirin daily  - Resume Lipitor    4.  Hypertension, unspecified type  - Well-controlled  - Continue with clonidine and Coreg  - Currently on hydrochlorothiazide and lisinopril  - Obtain medical records of prior sleep study patient reportedly had    F/U: 6 months    Magdalena Peña MD

## 2023-01-05 ENCOUNTER — OFFICE VISIT (OUTPATIENT)
Dept: CARDIOLOGY CLINIC | Age: 48
End: 2023-01-05
Payer: COMMERCIAL

## 2023-01-05 VITALS
HEART RATE: 62 BPM | HEIGHT: 59 IN | SYSTOLIC BLOOD PRESSURE: 136 MMHG | BODY MASS INDEX: 45.76 KG/M2 | DIASTOLIC BLOOD PRESSURE: 84 MMHG | WEIGHT: 227 LBS

## 2023-01-05 DIAGNOSIS — I25.10 CAD IN NATIVE ARTERY: ICD-10-CM

## 2023-01-05 DIAGNOSIS — I10 HYPERTENSION, UNSPECIFIED TYPE: ICD-10-CM

## 2023-01-05 DIAGNOSIS — Z00.00 HEALTHCARE MAINTENANCE: Primary | ICD-10-CM

## 2023-01-05 DIAGNOSIS — R06.09 CHRONIC DYSPNEA: ICD-10-CM

## 2023-01-05 PROCEDURE — 3079F DIAST BP 80-89 MM HG: CPT | Performed by: INTERNAL MEDICINE

## 2023-01-05 PROCEDURE — 3075F SYST BP GE 130 - 139MM HG: CPT | Performed by: INTERNAL MEDICINE

## 2023-01-05 PROCEDURE — 99214 OFFICE O/P EST MOD 30 MIN: CPT | Performed by: INTERNAL MEDICINE

## 2023-01-05 RX ORDER — CARVEDILOL 12.5 MG/1
12.5 TABLET ORAL 2 TIMES DAILY
COMMUNITY
Start: 2022-12-06

## 2023-01-05 RX ORDER — HYDROCHLOROTHIAZIDE 25 MG/1
TABLET ORAL
COMMUNITY
Start: 2022-12-06

## 2023-01-05 RX ORDER — ATORVASTATIN CALCIUM 40 MG/1
40 TABLET, FILM COATED ORAL DAILY
Qty: 90 TABLET | Refills: 3 | Status: SHIPPED | OUTPATIENT
Start: 2023-01-05

## 2023-01-24 ENCOUNTER — OFFICE VISIT (OUTPATIENT)
Dept: INTERNAL MEDICINE CLINIC | Facility: CLINIC | Age: 48
End: 2023-01-24
Payer: COMMERCIAL

## 2023-01-24 VITALS
HEIGHT: 59 IN | SYSTOLIC BLOOD PRESSURE: 110 MMHG | WEIGHT: 223 LBS | DIASTOLIC BLOOD PRESSURE: 70 MMHG | BODY MASS INDEX: 44.96 KG/M2

## 2023-01-24 DIAGNOSIS — F17.200 TOBACCO DEPENDENCE: ICD-10-CM

## 2023-01-24 DIAGNOSIS — Z12.31 ENCOUNTER FOR SCREENING MAMMOGRAM FOR MALIGNANT NEOPLASM OF BREAST: ICD-10-CM

## 2023-01-24 DIAGNOSIS — I10 ESSENTIAL HYPERTENSION: ICD-10-CM

## 2023-01-24 DIAGNOSIS — I25.10 CAD IN NATIVE ARTERY: ICD-10-CM

## 2023-01-24 DIAGNOSIS — E66.01 CLASS 3 SEVERE OBESITY DUE TO EXCESS CALORIES WITHOUT SERIOUS COMORBIDITY WITH BODY MASS INDEX (BMI) OF 45.0 TO 49.9 IN ADULT (HCC): ICD-10-CM

## 2023-01-24 DIAGNOSIS — I10 ESSENTIAL HYPERTENSION: Primary | ICD-10-CM

## 2023-01-24 PROBLEM — F41.9 ANXIETY: Status: ACTIVE | Noted: 2018-07-30

## 2023-01-24 PROCEDURE — 3078F DIAST BP <80 MM HG: CPT | Performed by: STUDENT IN AN ORGANIZED HEALTH CARE EDUCATION/TRAINING PROGRAM

## 2023-01-24 PROCEDURE — 99204 OFFICE O/P NEW MOD 45 MIN: CPT | Performed by: STUDENT IN AN ORGANIZED HEALTH CARE EDUCATION/TRAINING PROGRAM

## 2023-01-24 PROCEDURE — 3074F SYST BP LT 130 MM HG: CPT | Performed by: STUDENT IN AN ORGANIZED HEALTH CARE EDUCATION/TRAINING PROGRAM

## 2023-01-24 RX ORDER — BUPROPION HYDROCHLORIDE 150 MG/1
TABLET ORAL
Qty: 60 TABLET | Refills: 2 | Status: SHIPPED | OUTPATIENT
Start: 2023-01-24

## 2023-01-24 RX ORDER — ASPIRIN 81 MG/1
81 TABLET ORAL DAILY
COMMUNITY
Start: 2022-12-06

## 2023-01-24 SDOH — HEALTH STABILITY: PHYSICAL HEALTH: ON AVERAGE, HOW MANY MINUTES DO YOU ENGAGE IN EXERCISE AT THIS LEVEL?: PATIENT DECLINED

## 2023-01-24 SDOH — HEALTH STABILITY: PHYSICAL HEALTH: ON AVERAGE, HOW MANY DAYS PER WEEK DO YOU ENGAGE IN MODERATE TO STRENUOUS EXERCISE (LIKE A BRISK WALK)?: 5 DAYS

## 2023-01-24 ASSESSMENT — SOCIAL DETERMINANTS OF HEALTH (SDOH)
WITHIN THE LAST YEAR, HAVE TO BEEN RAPED OR FORCED TO HAVE ANY KIND OF SEXUAL ACTIVITY BY YOUR PARTNER OR EX-PARTNER?: NO
WITHIN THE LAST YEAR, HAVE YOU BEEN KICKED, HIT, SLAPPED, OR OTHERWISE PHYSICALLY HURT BY YOUR PARTNER OR EX-PARTNER?: PATIENT DECLINED
WITHIN THE LAST YEAR, HAVE YOU BEEN AFRAID OF YOUR PARTNER OR EX-PARTNER?: NO
WITHIN THE LAST YEAR, HAVE YOU BEEN HUMILIATED OR EMOTIONALLY ABUSED IN OTHER WAYS BY YOUR PARTNER OR EX-PARTNER?: PATIENT DECLINED

## 2023-01-24 ASSESSMENT — PATIENT HEALTH QUESTIONNAIRE - PHQ9
SUM OF ALL RESPONSES TO PHQ QUESTIONS 1-9: 0
2. FEELING DOWN, DEPRESSED OR HOPELESS: 0
SUM OF ALL RESPONSES TO PHQ9 QUESTIONS 1 & 2: 0
SUM OF ALL RESPONSES TO PHQ QUESTIONS 1-9: 0
1. LITTLE INTEREST OR PLEASURE IN DOING THINGS: 0

## 2023-01-24 ASSESSMENT — ENCOUNTER SYMPTOMS
NAUSEA: 0
ABDOMINAL PAIN: 0
VOMITING: 0
SHORTNESS OF BREATH: 0

## 2023-01-24 NOTE — PROGRESS NOTES
SUBJECTIVE:   Weston Thomas is a 50 y.o. female seen for a visit regarding   Chief Complaint   Patient presents with    New Patient     Pt here as a new patient     Sleep Problem     Pt stated she has been getting only 3-4 hrs a night noted the past tow months     Weight Management     Pt concerned with not being able  to reduce her weight         HPI: Has been a while since seeing PCP. 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 She is newly engaged. HTN/HLD: She is on Coreg 12.5 mg twice a day, hydrochlorothiazide 25 mg daily, lisinopril 40 mg daily, clonidine 0.2 mg twice a day, Lipitor 40 mg daily. Had sleep study in the past.    Obesity: Has cut out fried foods. She would like to get down to 160 lbs. History of depression, anxiety: Previously on SSRI which helped. Currently symptoms are controlled. CAD with non-obstructive disease on [facility] 2018, LV \hypertrophy, HFpEF, hypertension: Follows with cardiology Dr. Murphy Poster. Tobacco use: Tried quitting before, quit cold turkey at one time for 4 years while pregnant. Went back to smoking when father had massive stroke due to significant stress. Smoking about 1 PPD, has cut down quite a bit from prior history at 3 PPD. She feels ready to quit. She has grandchildren and this is a good motivator for her. Healthcare maintenance: Due for mammo. Past Medical History, Past Surgical History, Family history, Social History, and Medications were all reviewed with the patient today and updated as necessary.        Patient Active Problem List   Diagnosis    Healthcare maintenance    Chronic dyspnea    CAD in native artery    Essential hypertension    Anxiety    Tobacco dependence     Past Surgical History:   Procedure Laterality Date     SECTION  1999. 2000    First was emergency and second was scheduled    CHOLECYSTECTOMY      PARTIAL HYSTERECTOMY (CERVIX NOT REMOVED)  2014    TUBAL LIGATION      c section times 2     Family History   Problem Relation Age of Onset    Allergy (Severe) Mother     Asthma Mother     Coronary Art Dis Mother     Depression Mother     Heart Attack Mother     Heart Disease Mother     High Blood Pressure Mother     High Cholesterol Mother     Mental Illness Mother     Obesity Mother     Allergy (Severe) Father     Clotting Disorder Father     Diabetes Father     High Blood Pressure Father     High Cholesterol Father     Obesity Father     Stroke Father     Allergy (Severe) Brother     Asthma Brother     Heart Attack Brother     Heart Disease Brother     High Blood Pressure Brother     High Cholesterol Brother     Obesity Brother     Allergy (Severe) Brother     Cancer Brother     High Blood Pressure Brother     Obesity Brother     Cancer Paternal Uncle     Asthma Maternal Grandmother     Coronary Art Dis Maternal Grandmother     Heart Disease Maternal Grandmother     High Blood Pressure Maternal Grandmother     Obesity Maternal Grandmother     Heart Attack Maternal Grandfather     High Blood Pressure Maternal Grandfather      Social History     Socioeconomic History    Marital status: Single     Spouse name: Not on file    Number of children: Not on file    Years of education: Not on file    Highest education level: Not on file   Occupational History    Not on file   Tobacco Use    Smoking status: Every Day     Packs/day: 1.00     Years: 31.00     Pack years: 31.00     Types: Cigarettes     Start date: 6/19/1991    Smokeless tobacco: Never   Vaping Use    Vaping Use: Never used   Substance and Sexual Activity    Alcohol use:  Yes     Alcohol/week: 2.0 standard drinks     Types: 2 Glasses of wine per week    Drug use: Never    Sexual activity: Yes     Partners: Male     Birth control/protection: Surgical, None   Other Topics Concern    Not on file   Social History Narrative    Not on file     Social Determinants of Health     Financial Resource Strain: Not on file   Food Insecurity: Not on file   Transportation Needs: Not on file   Physical Activity: Unknown    Days of Exercise per Week: 5 days    Minutes of Exercise per Session: Patient refused   Stress: Not on file   Social Connections: Not on file   Intimate Partner Violence: Unknown    Fear of Current or Ex-Partner: No    Emotionally Abused: Patient refused    Physically Abused: Patient refused    Sexually Abused: No   Housing Stability: Not on file     Current Outpatient Medications   Medication Sig Dispense Refill    aspirin 81 MG EC tablet Take 81 mg by mouth daily      buPROPion (WELLBUTRIN XL) 150 MG extended release tablet 150 mg once daily for 3 days, then increase to 150 mg twice daily. Quit smoking 7 days after treatment or start tapering down. 60 tablet 2    hydroCHLOROthiazide (HYDRODIURIL) 25 MG tablet Take 25 mg by mouth daily      carvedilol (COREG) 12.5 MG tablet Take 12.5 mg by mouth 2 times daily      atorvastatin (LIPITOR) 40 MG tablet Take 1 tablet by mouth daily (Patient taking differently: Take 40 mg by mouth every evening) 90 tablet 3    cloNIDine (CATAPRES) 0.2 MG tablet Take 1 tablet by mouth 2 times daily 60 tablet 0    lisinopril (PRINIVIL;ZESTRIL) 40 MG tablet Take 40 mg by mouth daily       No current facility-administered medications for this visit. Allergies as of 01/24/2023 - Fully Reviewed 01/24/2023   Allergen Reaction Noted    Latex Hives 07/16/2017    Doxycycline Hives 07/15/2017    Meperidine Hives 10/02/2008    Sulfa antibiotics Hives 10/02/2008    Cephalexin Rash 01/19/2022    Ketorolac Rash 07/15/2017    Metoclopramide Anxiety 07/15/2017    Nsaids Anxiety 09/02/2017         Review of Systems   Constitutional:  Negative for chills and fever. HENT:  Negative for congestion. Eyes:  Negative for visual disturbance. Respiratory:  Negative for shortness of breath. Cardiovascular:  Negative for chest pain. Gastrointestinal:  Negative for abdominal pain, nausea and vomiting. Musculoskeletal:  Negative for arthralgias.   Neurological:  Negative for syncope and headaches.       OBJECTIVE:    Vitals:    01/24/23 1101   BP: 110/70   Weight: 223 lb (101.2 kg)   Height: 4' 11\" (1.499 m)        Physical Exam  Constitutional:       General: She is not in acute distress.     Appearance: Normal appearance.   HENT:      Head: Normocephalic and atraumatic.   Eyes:      Extraocular Movements: Extraocular movements intact.      Conjunctiva/sclera: Conjunctivae normal.   Cardiovascular:      Rate and Rhythm: Normal rate and regular rhythm.      Heart sounds: No murmur heard.  Pulmonary:      Effort: Pulmonary effort is normal.      Breath sounds: Normal breath sounds. No wheezing, rhonchi or rales.   Skin:     General: Skin is warm and dry.   Neurological:      Mental Status: She is alert. Mental status is at baseline.   Psychiatric:         Mood and Affect: Mood normal.         Behavior: Behavior normal.          Medical problems and test results were reviewed with the patient today.     Lab Results   Component Value Date    WBC 8.0 08/23/2022    HGB 16.5 (H) 08/23/2022    HCT 50.5 (H) 08/23/2022    MCV 89.7 08/23/2022     08/23/2022      Lab Results   Component Value Date/Time     08/23/2022 09:06 PM    K 3.7 08/23/2022 09:06 PM     08/23/2022 09:06 PM    CO2 26 08/23/2022 09:06 PM    BUN 12 08/23/2022 09:06 PM    CREATININE 0.80 08/23/2022 09:06 PM    GLUCOSE 88 08/23/2022 09:06 PM    CALCIUM 8.7 08/23/2022 09:06 PM       Lab Results   Component Value Date     08/23/2022    K 3.7 08/23/2022     (H) 08/23/2022    CO2 26 08/23/2022    BUN 12 08/23/2022    CREATININE 0.80 08/23/2022    GLUCOSE 88 08/23/2022    CALCIUM 8.7 08/23/2022    PROT 7.2 08/23/2022    LABALBU 3.4 (L) 08/23/2022    BILITOT 0.2 08/23/2022    ALKPHOS 98 08/23/2022    AST 23 08/23/2022    ALT 24 08/23/2022    LABGLOM >60 08/23/2022    GFRAA >60 08/23/2022    AGRATIO 0.9 (L) 01/24/2022    GLOB 3.8 (H) 08/23/2022     No results found for: TSHFT4,  TSH, TSHREFLEX, XYL8PSS   No results found for: CHOL  No results found for: TRIG  No results found for: HDL  No results found for: LDLCHOLESTEROL, LDLCALC  No results found for: LABVLDL, VLDL  No results found for: CHOLHDLRATIO   No results found for: LABA1C     ASSESSMENT and PLAN     1. Essential hypertension  -     Lipid Panel; Future  -     Microalbumin / Creatinine Urine Ratio; Future  -     Hemoglobin A1C; Future  -     Comprehensive Metabolic Panel; Future  -     CBC with Auto Differential; Future  -     TSH with Reflex; Future  -     Urinalysis with Reflex to Culture; Future  -     [facility] - Weight Loss, [facility]  2. Encounter for screening mammogram for malignant neoplasm of breast  -     [facility] BRIGHT DIGITAL SCREEN BILATERAL; Future  3. CAD in native artery  4. Tobacco dependence  5. Class 3 severe obesity due to excess calories without serious comorbidity with body mass index (BMI) of 45.0 to 49.9 in adult (HCC)  -     [facility] - Weight Loss, [facility]     We discussed weight loss and tobacco cessation. We will try Wellbutrin, refer to healthy self. Encouraged to let us know if side effects occur. Get labs, mammo. Return in about 6 weeks (around 3/7/2023) for may be virtual, routine follow up.      Dominick Wan MD

## 2023-01-25 LAB
ALBUMIN SERPL-MCNC: 3.6 G/DL (ref 3.5–5)
ALBUMIN/GLOB SERPL: 0.9 (ref 0.4–1.6)
ALP SERPL-CCNC: 110 U/L (ref 50–136)
ALT SERPL-CCNC: 27 U/L (ref 12–65)
ANION GAP SERPL CALC-SCNC: 9 MMOL/L (ref 2–11)
APPEARANCE UR: ABNORMAL
AST SERPL-CCNC: 15 U/L (ref 15–37)
BACTERIA URNS QL MICRO: ABNORMAL /HPF
BASOPHILS # BLD: 0 K/UL (ref 0–0.2)
BASOPHILS NFR BLD: 1 % (ref 0–2)
BILIRUB SERPL-MCNC: 0.3 MG/DL (ref 0.2–1.1)
BILIRUB UR QL: NEGATIVE
BUN SERPL-MCNC: 9 MG/DL (ref 6–23)
CALCIUM SERPL-MCNC: 9.1 MG/DL (ref 8.3–10.4)
CASTS URNS QL MICRO: 0 /LPF
CHLORIDE SERPL-SCNC: 106 MMOL/L (ref 101–110)
CHOLEST SERPL-MCNC: 130 MG/DL
CO2 SERPL-SCNC: 28 MMOL/L (ref 21–32)
COLOR UR: ABNORMAL
CREAT SERPL-MCNC: 0.9 MG/DL (ref 0.6–1)
CREAT UR-MCNC: 361 MG/DL
CRYSTALS URNS QL MICRO: 0 /LPF
DIFFERENTIAL METHOD BLD: ABNORMAL
EOSINOPHIL # BLD: 0.2 K/UL (ref 0–0.8)
EOSINOPHIL NFR BLD: 3 % (ref 0.5–7.8)
EPI CELLS #/AREA URNS HPF: ABNORMAL /HPF
ERYTHROCYTE [DISTWIDTH] IN BLOOD BY AUTOMATED COUNT: 14.6 % (ref 11.9–14.6)
EST. AVERAGE GLUCOSE BLD GHB EST-MCNC: 123 MG/DL
GLOBULIN SER CALC-MCNC: 3.8 G/DL (ref 2.8–4.5)
GLUCOSE SERPL-MCNC: 96 MG/DL (ref 65–100)
GLUCOSE UR STRIP.AUTO-MCNC: NEGATIVE MG/DL
HBA1C MFR BLD: 5.9 % (ref 4.8–5.6)
HCT VFR BLD AUTO: 51.7 % (ref 35.8–46.3)
HDLC SERPL-MCNC: 45 MG/DL (ref 40–60)
HDLC SERPL: 2.9
HGB BLD-MCNC: 16.4 G/DL (ref 11.7–15.4)
HGB UR QL STRIP: NEGATIVE
IMM GRANULOCYTES # BLD AUTO: 0 K/UL (ref 0–0.5)
IMM GRANULOCYTES NFR BLD AUTO: 0 % (ref 0–5)
KETONES UR QL STRIP.AUTO: ABNORMAL MG/DL
LDLC SERPL CALC-MCNC: 57 MG/DL
LEUKOCYTE ESTERASE UR QL STRIP.AUTO: ABNORMAL
LYMPHOCYTES # BLD: 2.3 K/UL (ref 0.5–4.6)
LYMPHOCYTES NFR BLD: 33 % (ref 13–44)
MCH RBC QN AUTO: 29.1 PG (ref 26.1–32.9)
MCHC RBC AUTO-ENTMCNC: 31.7 G/DL (ref 31.4–35)
MCV RBC AUTO: 91.8 FL (ref 82–102)
MICROALBUMIN UR-MCNC: 3.73 MG/DL (ref 0–3)
MICROALBUMIN/CREAT UR-RTO: 10 MG/G (ref 0–30)
MONOCYTES # BLD: 0.5 K/UL (ref 0.1–1.3)
MONOCYTES NFR BLD: 7 % (ref 4–12)
MUCOUS THREADS URNS QL MICRO: 0 /LPF
NEUTS SEG # BLD: 4.1 K/UL (ref 1.7–8.2)
NEUTS SEG NFR BLD: 56 % (ref 43–78)
NITRITE UR QL STRIP.AUTO: NEGATIVE
NRBC # BLD: 0 K/UL (ref 0–0.2)
PH UR STRIP: 5.5 (ref 5–9)
PLATELET # BLD AUTO: 222 K/UL (ref 150–450)
PMV BLD AUTO: 11.9 FL (ref 9.4–12.3)
POTASSIUM SERPL-SCNC: 3.6 MMOL/L (ref 3.5–5.1)
PROT SERPL-MCNC: 7.4 G/DL (ref 6.3–8.2)
PROT UR STRIP-MCNC: ABNORMAL MG/DL
RBC # BLD AUTO: 5.63 M/UL (ref 4.05–5.2)
RBC #/AREA URNS HPF: ABNORMAL /HPF
SODIUM SERPL-SCNC: 143 MMOL/L (ref 133–143)
SP GR UR REFRACTOMETRY: 1.03 (ref 1–1.02)
TRIGL SERPL-MCNC: 140 MG/DL (ref 35–150)
TSH W FREE THYROID IF ABNORMAL: 1.24 UIU/ML (ref 0.36–3.74)
URINE CULTURE IF INDICATED: ABNORMAL
UROBILINOGEN UR QL STRIP.AUTO: 1 EU/DL (ref 0.2–1)
VLDLC SERPL CALC-MCNC: 28 MG/DL (ref 6–23)
WBC # BLD AUTO: 7.1 K/UL (ref 4.3–11.1)
WBC URNS QL MICRO: ABNORMAL /HPF

## 2023-02-04 PROBLEM — Z00.00 HEALTHCARE MAINTENANCE: Status: RESOLVED | Noted: 2023-01-05 | Resolved: 2023-02-04

## 2023-02-10 ENCOUNTER — HOSPITAL ENCOUNTER (OUTPATIENT)
Dept: MAMMOGRAPHY | Age: 48
Discharge: HOME | End: 2023-02-10
Payer: COMMERCIAL

## 2023-02-10 DIAGNOSIS — Z12.31 ENCOUNTER FOR SCREENING MAMMOGRAM FOR MALIGNANT NEOPLASM OF BREAST: ICD-10-CM

## 2023-02-10 PROCEDURE — 77063 BREAST TOMOSYNTHESIS BI: CPT

## 2023-02-15 ENCOUNTER — APPOINTMENT (OUTPATIENT)
Dept: GENERAL RADIOLOGY | Age: 48
End: 2023-02-15
Payer: COMMERCIAL

## 2023-02-15 ENCOUNTER — HOSPITAL ENCOUNTER (EMERGENCY)
Age: 48
Discharge: HOME | End: 2023-02-16
Attending: EMERGENCY MEDICINE
Payer: COMMERCIAL

## 2023-02-15 DIAGNOSIS — R07.9 CHEST PAIN, UNSPECIFIED TYPE: Primary | ICD-10-CM

## 2023-02-15 LAB
ALBUMIN SERPL-MCNC: 4.1 G/DL (ref 3.5–5)
ALBUMIN/GLOB SERPL: 1 (ref 0.4–1.6)
ALP SERPL-CCNC: 125 U/L (ref 50–136)
ALT SERPL-CCNC: 42 U/L (ref 12–65)
ANION GAP SERPL CALC-SCNC: 5 MMOL/L (ref 2–11)
AST SERPL-CCNC: 39 U/L (ref 15–37)
BILIRUB SERPL-MCNC: 0.6 MG/DL (ref 0.2–1.1)
BUN SERPL-MCNC: 19 MG/DL (ref 6–23)
CALCIUM SERPL-MCNC: 9.7 MG/DL (ref 8.3–10.4)
CHLORIDE SERPL-SCNC: 107 MMOL/L (ref 101–110)
CO2 SERPL-SCNC: 27 MMOL/L (ref 21–32)
CREAT SERPL-MCNC: 1.1 MG/DL (ref 0.6–1)
D DIMER PPP FEU-MCNC: 0.42 UG/ML(FEU)
EKG ATRIAL RATE: 103 BPM
EKG DIAGNOSIS: NORMAL
EKG P AXIS: 47 DEGREES
EKG P-R INTERVAL: 125 MS
EKG Q-T INTERVAL: 368 MS
EKG QRS DURATION: 95 MS
EKG QTC CALCULATION (BAZETT): 491 MS
EKG R AXIS: 33 DEGREES
EKG T AXIS: 210 DEGREES
EKG VENTRICULAR RATE: 107 BPM
ERYTHROCYTE [DISTWIDTH] IN BLOOD BY AUTOMATED COUNT: 14.3 % (ref 11.9–14.6)
GLOBULIN SER CALC-MCNC: 4.2 G/DL (ref 2.8–4.5)
GLUCOSE SERPL-MCNC: 98 MG/DL (ref 65–100)
HCT VFR BLD AUTO: 51.5 % (ref 35.8–46.3)
HGB BLD-MCNC: 17.4 G/DL (ref 11.7–15.4)
MCH RBC QN AUTO: 29.3 PG (ref 26.1–32.9)
MCHC RBC AUTO-ENTMCNC: 33.8 G/DL (ref 31.4–35)
MCV RBC AUTO: 86.8 FL (ref 82–102)
NRBC # BLD: 0 K/UL (ref 0–0.2)
PLATELET # BLD AUTO: 274 K/UL (ref 150–450)
PMV BLD AUTO: 11.4 FL (ref 9.4–12.3)
POTASSIUM SERPL-SCNC: 3.1 MMOL/L (ref 3.5–5.1)
PROT SERPL-MCNC: 8.3 G/DL (ref 6.3–8.2)
RBC # BLD AUTO: 5.93 M/UL (ref 4.05–5.2)
SODIUM SERPL-SCNC: 139 MMOL/L (ref 133–143)
TROPONIN I SERPL HS-MCNC: 42.3 PG/ML (ref 0–14)
TROPONIN I SERPL HS-MCNC: 50.7 PG/ML (ref 0–14)
WBC # BLD AUTO: 9.4 K/UL (ref 4.3–11.1)

## 2023-02-15 PROCEDURE — 94760 N-INVAS EAR/PLS OXIMETRY 1: CPT

## 2023-02-15 PROCEDURE — 93005 ELECTROCARDIOGRAM TRACING: CPT | Performed by: EMERGENCY MEDICINE

## 2023-02-15 PROCEDURE — 85379 FIBRIN DEGRADATION QUANT: CPT

## 2023-02-15 PROCEDURE — 71045 X-RAY EXAM CHEST 1 VIEW: CPT

## 2023-02-15 PROCEDURE — 6370000000 HC RX 637 (ALT 250 FOR IP): Performed by: EMERGENCY MEDICINE

## 2023-02-15 PROCEDURE — 80053 COMPREHEN METABOLIC PANEL: CPT

## 2023-02-15 PROCEDURE — 85027 COMPLETE CBC AUTOMATED: CPT

## 2023-02-15 PROCEDURE — 6360000002 HC RX W HCPCS: Performed by: EMERGENCY MEDICINE

## 2023-02-15 PROCEDURE — 99285 EMERGENCY DEPT VISIT HI MDM: CPT

## 2023-02-15 PROCEDURE — 84484 ASSAY OF TROPONIN QUANT: CPT

## 2023-02-15 PROCEDURE — 96374 THER/PROPH/DIAG INJ IV PUSH: CPT

## 2023-02-15 RX ORDER — CLONIDINE HYDROCHLORIDE 0.2 MG/1
0.2 TABLET ORAL 2 TIMES DAILY
Qty: 60 TABLET | Refills: 0 | Status: CANCELLED | OUTPATIENT
Start: 2023-02-15

## 2023-02-15 RX ORDER — CARVEDILOL 12.5 MG/1
12.5 TABLET ORAL 2 TIMES DAILY
Qty: 60 TABLET | Refills: 0 | Status: CANCELLED | OUTPATIENT
Start: 2023-02-15

## 2023-02-15 RX ORDER — CARVEDILOL 12.5 MG/1
12.5 TABLET ORAL 2 TIMES DAILY
Qty: 60 TABLET | Refills: 0 | Status: SHIPPED | OUTPATIENT
Start: 2023-02-15

## 2023-02-15 RX ORDER — POTASSIUM CHLORIDE 20 MEQ/1
40 TABLET, EXTENDED RELEASE ORAL ONCE
Status: COMPLETED | OUTPATIENT
Start: 2023-02-15 | End: 2023-02-15

## 2023-02-15 RX ORDER — CLONIDINE HYDROCHLORIDE 0.2 MG/1
0.2 TABLET ORAL 2 TIMES DAILY
Qty: 60 TABLET | Refills: 0 | Status: SHIPPED | OUTPATIENT
Start: 2023-02-15

## 2023-02-15 RX ORDER — ONDANSETRON 2 MG/ML
4 INJECTION INTRAMUSCULAR; INTRAVENOUS ONCE
Status: COMPLETED | OUTPATIENT
Start: 2023-02-15 | End: 2023-02-15

## 2023-02-15 RX ADMIN — ONDANSETRON 4 MG: 2 INJECTION INTRAMUSCULAR; INTRAVENOUS at 19:48

## 2023-02-15 RX ADMIN — POTASSIUM CHLORIDE 40 MEQ: 1500 TABLET, EXTENDED RELEASE ORAL at 23:21

## 2023-02-15 ASSESSMENT — PAIN DESCRIPTION - DESCRIPTORS: DESCRIPTORS: SHARP;PRESSURE

## 2023-02-15 ASSESSMENT — LIFESTYLE VARIABLES
HOW MANY STANDARD DRINKS CONTAINING ALCOHOL DO YOU HAVE ON A TYPICAL DAY: 1 OR 2
HOW OFTEN DO YOU HAVE A DRINK CONTAINING ALCOHOL: MONTHLY OR LESS

## 2023-02-15 ASSESSMENT — ENCOUNTER SYMPTOMS
VOMITING: 0
SHORTNESS OF BREATH: 0
NAUSEA: 1
COUGH: 0

## 2023-02-15 ASSESSMENT — PAIN DESCRIPTION - LOCATION: LOCATION: CHEST

## 2023-02-15 ASSESSMENT — PAIN SCALES - GENERAL: PAINLEVEL_OUTOF10: 7

## 2023-02-15 ASSESSMENT — PAIN DESCRIPTION - PAIN TYPE: TYPE: ACUTE PAIN

## 2023-02-15 ASSESSMENT — PAIN DESCRIPTION - ORIENTATION: ORIENTATION: LEFT

## 2023-02-15 ASSESSMENT — PAIN - FUNCTIONAL ASSESSMENT: PAIN_FUNCTIONAL_ASSESSMENT: 0-10

## 2023-02-15 NOTE — ED TRIAGE NOTES
Pt arrives from work via EMS. Pt complains of Chest pain that started early and hoped it would pass but didn't. Upon EMS arrival EKG showed tachy w/ ST depressions. 4 81mg aspirin w/ EMS. Provided 2L NC w/ EMS.

## 2023-02-15 NOTE — TELEPHONE ENCOUNTER
Patient requesting refills of the following to be sent to [facility] on [facility] in [place]     Carvedilol 12.5 mg   Clonidine Hcl . 2 mg

## 2023-02-16 VITALS
SYSTOLIC BLOOD PRESSURE: 142 MMHG | TEMPERATURE: 97.5 F | HEART RATE: 95 BPM | DIASTOLIC BLOOD PRESSURE: 87 MMHG | WEIGHT: 215 LBS | HEIGHT: 59 IN | RESPIRATION RATE: 17 BRPM | BODY MASS INDEX: 43.34 KG/M2 | OXYGEN SATURATION: 94 %

## 2023-02-16 NOTE — DISCHARGE INSTRUCTIONS
Follow up with your cardiologist. Return to the emergency department if you have any further problems or concerns.

## 2023-02-16 NOTE — ED NOTES
Report and Pt care transferred to [facility], [address], RN  02/15/23 [id number]

## 2023-02-16 NOTE — ED PROVIDER NOTES
Emergency Department Provider Note                   PCP:                Siva Gaytan MD               Age: 50 y.o. Sex: female       ICD-10-CM    1. Chest pain, unspecified type  R07.9           DISPOSITION         MDM  Number of Diagnoses or Management Options  Chest pain, unspecified type: new, needed workup  Diagnosis management comments: Patient presents with left side chest pain. EKG showed no acute changes compared to prior. CXR images reviewed and agree with radiologist interpretation of no acute findings. Initial blood work showed a slightly elevated troponin. Patient did note she takes a Vitamin with a large dose of Biotin, and this is known to skew troponin results. However, Delta Troponin only minimally changed and this was an approximately 3.5 hour level. Potassium was slightly low, PO replacement ordered. Patient did well under observation. She was requesting discharge home at the time of disposition. Recommend close follow up with PCP and cardiology. Patient was in agreement.           Amount and/or Complexity of Data Reviewed  Clinical lab tests: ordered and reviewed  Tests in the radiology section of CPT®: ordered and reviewed  Tests in the medicine section of CPT®: ordered and reviewed  Discussion of test results with the performing providers: no  Decide to obtain previous medical records or to obtain history from someone other than the patient: no  Obtain history from someone other than the patient: no  Review and summarize past medical records: no  Discuss the patient with other providers: no  Independent visualization of images, tracings, or specimens: yes    Risk of Complications, Morbidity, and/or Mortality  Presenting problems: high  Diagnostic procedures: low  Management options: low    Patient Progress  Patient progress: stable             Orders Placed This Encounter   Procedures    XR CHEST PORTABLE    CBC    Comprehensive Metabolic Panel    Troponin    D-Dimer, Quantitative Troponin    Cardiac Monitor    Pulse Oximetry    EKG 12 Lead    Saline lock IV        Medications   ondansetron (ZOFRAN) injection 4 mg (4 mg IntraVENous Given 2/15/23 194)   potassium chloride (KLOR-CON M) extended release tablet 40 mEq (40 mEq Oral Given 2/15/23 2321)       New Prescriptions    No medications on file        Justyn Guzman is a 50 y.o. female who presents to the Emergency Department with chief complaint of    Chief Complaint   Patient presents with    Chest Pain      He reports that he has had left-sided Chest pain that has been present all day. It does wax and wane in intensity but there is a component that is always present. She points to the left side of her chest and states it is sharp in nature. The patient does have a cardiac history. She notes no aggravating or alleviating factors. EMS gave aspirin. The history is provided by the patient. No  was used. All other systems reviewed and are negative unless otherwise stated in the history of present illness section. Review of Systems   Constitutional:  Negative for chills and fever. Respiratory:  Negative for cough and shortness of breath. Cardiovascular:  Positive for chest pain. Negative for palpitations. Gastrointestinal:  Positive for nausea. Negative for vomiting.      Past Medical History:   Diagnosis Date    Allergic rhinitis     Seasonal    Anxiety 2015    CAD in native artery 2023    CHF (congestive heart failure) ([address] 2014    Chronic back pain     Depression 1991    I have struggled with depression on and off since then    Headache 2001    Heart failure ([address]     Hypertension     Obesity 1994    Had a hard time losing weight after my first child    Psychiatric disorder     anxiety and depression        Past Surgical History:   Procedure Laterality Date     SECTION  1999. 2000    First was emergency and second was scheduled    CHOLECYSTECTOMY PARTIAL HYSTERECTOMY (CERVIX NOT REMOVED)  06/2014    TUBAL LIGATION      c section times 2        Family History   Problem Relation Age of Onset    Allergy (Severe) Mother     Asthma Mother     Coronary Art Dis Mother     Depression Mother     Heart Attack Mother     Heart Disease Mother     High Blood Pressure Mother     High Cholesterol Mother     Mental Illness Mother     Obesity Mother     Allergy (Severe) Father     Clotting Disorder Father     Diabetes Father     High Blood Pressure Father     High Cholesterol Father     Obesity Father     Stroke Father     Allergy (Severe) Brother     Asthma Brother     Heart Attack Brother     Heart Disease Brother     High Blood Pressure Brother     High Cholesterol Brother     Obesity Brother     Allergy (Severe) Brother     Cancer Brother     High Blood Pressure Brother     Obesity Brother     Asthma Maternal Grandmother     Coronary Art Dis Maternal Grandmother     Heart Disease Maternal Grandmother     High Blood Pressure Maternal Grandmother     Obesity Maternal Grandmother     Heart Attack Maternal Grandfather     High Blood Pressure Maternal Grandfather     Cancer Paternal Uncle     Breast Cancer Neg Hx         Social History     Socioeconomic History    Marital status: Single   Tobacco Use    Smoking status: Every Day     Packs/day: 1.00     Years: 31.00     Pack years: 31.00     Types: Cigarettes     Start date: 6/19/1991    Smokeless tobacco: Never   Vaping Use    Vaping Use: Never used   Substance and Sexual Activity    Alcohol use:  Yes     Alcohol/week: 2.0 standard drinks     Types: 2 Glasses of wine per week    Drug use: Never    Sexual activity: Yes     Partners: Male     Birth control/protection: Surgical, None     Social Determinants of Health     Physical Activity: Unknown    Days of Exercise per Week: 5 days    Minutes of Exercise per Session: Patient refused   Intimate Partner Violence: Unknown    Fear of Current or Ex-Partner: No    Emotionally Abused: Patient refused    Physically Abused: Patient refused    Sexually Abused: No        Allergies: Latex, Doxycycline, Meperidine, Sulfa antibiotics, Cephalexin, Ketorolac, Metoclopramide, and Nsaids    Previous Medications    ASPIRIN 81 MG EC TABLET    Take 81 mg by mouth daily    ATORVASTATIN (LIPITOR) 40 MG TABLET    Take 1 tablet by mouth daily    BUPROPION (WELLBUTRIN XL) 150 MG EXTENDED RELEASE TABLET    150 mg once daily for 3 days, then increase to 150 mg twice daily. Quit smoking 7 days after treatment or start tapering down. HYDROCHLOROTHIAZIDE (HYDRODIURIL) 25 MG TABLET    Take 25 mg by mouth daily    LISINOPRIL (PRINIVIL;ZESTRIL) 40 MG TABLET    Take 40 mg by mouth daily        Vitals signs and nursing note reviewed. Patient Vitals for the past 4 hrs:   Pulse Resp BP SpO2   02/15/23 2230 98 20 133/77 94 %   02/15/23 2200 97 21 139/69 93 %   02/15/23 2126 -- 21 (!) 144/76 94 %   02/15/23 2056 97 23 (!) 144/82 90 %   02/15/23 2026 (!) 106 13 139/85 94 %   02/15/23 1956 99 17 138/82 92 %   02/15/23 1945 (!) 109 14 122/79 94 %          Physical Exam  Constitutional:       Appearance: Normal appearance. HENT:      Mouth/Throat:      Pharynx: Oropharynx is clear. Eyes:      Extraocular Movements: Extraocular movements intact. Pupils: Pupils are equal, round, and reactive to light. Cardiovascular:      Rate and Rhythm: Normal rate and regular rhythm. Pulses: Normal pulses. Heart sounds: Normal heart sounds. Pulmonary:      Effort: Pulmonary effort is normal.      Breath sounds: Normal breath sounds. Abdominal:      General: Abdomen is flat. Bowel sounds are normal.      Palpations: Abdomen is soft. Musculoskeletal:      Cervical back: Normal range of motion and neck supple. Comments: Right wrist is in a Velcro splint. Skin:     General: Skin is warm and dry. Neurological:      General: No focal deficit present.       Mental Status: She is alert and oriented to person, place, and time. Psychiatric:         Mood and Affect: Mood normal.        Procedures    ED EKG Interpretation  EKG was interpreted in the absence of a cardiologist.    Rate: 107  EKG Interpretation: EKG Interpretation: sinus rhythm, tachycardia, no change from prior. ST Segments: Nonspecific ST segments - NO STEMI    Results for orders placed or performed during the hospital encounter of 02/15/23   XR CHEST PORTABLE    Narrative    EXAMINATION: XR CHEST PORTABLE      DATE OF EXAM: 2/15/2023 7:15 PM    HISTORY: Chest Pain     COMPARISON: 8/23/2022. FINDINGS:    Cardiomediastinal silhouette within normal limits and unchanged. There is some   exam limitation due to poor penetration. No confluent consolidation, sizable   effusion or pneumothorax. Impression    1. No acute pulmonary process identified. Thank you for the referral of this patient. This exam was interpreted by an   [address] [employer] certified diagnostic radiologist with   fellowship training. If there are any questions regarding this exam please feel   free to contact us directly at [phone].     [address]     Gurinder Black D.O.   2/15/2023 8:00:00 PM   CBC   Result Value Ref Range    WBC 9.4 4.3 - 11.1 K/uL    RBC 5.93 (H) 4.05 - 5.2 M/uL    Hemoglobin 17.4 (H) 11.7 - 15.4 g/dL    Hematocrit 51.5 (H) 35.8 - 46.3 %    MCV 86.8 82 - 102 FL    MCH 29.3 26.1 - 32.9 PG    MCHC 33.8 31.4 - 35.0 g/dL    RDW 14.3 11.9 - 14.6 %    Platelets 819 887 - 293 K/uL    MPV 11.4 9.4 - 12.3 FL    nRBC 0.00 0.0 - 0.2 K/uL   Comprehensive Metabolic Panel   Result Value Ref Range    Sodium 139 133 - 143 mmol/L    Potassium 3.1 (L) 3.5 - 5.1 mmol/L    Chloride 107 101 - 110 mmol/L    CO2 27 21 - 32 mmol/L    Anion Gap 5 2 - 11 mmol/L    Glucose 98 65 - 100 mg/dL    BUN 19 6 - 23 MG/DL    Creatinine 1.10 (H) 0.6 - 1.0 MG/DL    Est, Glom Filt Rate >60 >60 ml/min/1.73m2    Calcium 9.7 8.3 - 10.4 MG/DL    Total Bilirubin 0.6 0.2 - 1.1 MG/DL    ALT 42 12 - 65 U/L    AST 39 (H) 15 - 37 U/L    Alk Phosphatase 125 50 - 136 U/L    Total Protein 8.3 (H) 6.3 - 8.2 g/dL    Albumin 4.1 3.5 - 5.0 g/dL    Globulin 4.2 2.8 - 4.5 g/dL    Albumin/Globulin Ratio 1.0 0.4 - 1.6     Troponin   Result Value Ref Range    Troponin, High Sensitivity 42.3 (H) 0 - 14 pg/mL   D-Dimer, Quantitative   Result Value Ref Range    D-Dimer, Quant 0.42 <0.56 ug/ml(FEU)   Troponin   Result Value Ref Range    Troponin, High Sensitivity 50.7 (H) 0 - 14 pg/mL   EKG 12 Lead   Result Value Ref Range    Ventricular Rate 107 BPM    Atrial Rate 103 BPM    P-R Interval 125 ms    QRS Duration 95 ms    Q-T Interval 368 ms    QTc Calculation (Bazett) 491 ms    P Axis 47 degrees    R Axis 33 degrees    T Axis 210 degrees    Diagnosis Sinus tachycardia         XR CHEST PORTABLE   Final Result      1. No acute pulmonary process identified. Thank you for the referral of this patient. This exam was interpreted by an    [address] [employer] certified diagnostic radiologist with    fellowship training. If there are any questions regarding this exam please feel    free to contact us directly at [phone]. [address] Orellana D.O.    2/15/2023 8:00:00 PM                            Voice dictation software was used during the making of this note. This software is not perfect and grammatical and other typographical errors may be present. This note has not been completely proofread for errors.      Juan A Jaquez MD  02/15/23 [id number]

## 2023-02-20 ENCOUNTER — TELEPHONE (OUTPATIENT)
Dept: INTERNAL MEDICINE CLINIC | Facility: CLINIC | Age: 48
End: 2023-02-20

## 2023-02-20 NOTE — TELEPHONE ENCOUNTER
Pt had called today about getting a ED follow up. Pt was seen at the ER on 2/15 for chest pain. Pt said they were very busy and did run some labs for her but never formally diagnosed her with anything. She was told to follow up with her PCP. Pt said she did not have any more information she could give me. After speaking with clinical staff I called back the pt to ask follow up questions. Pt said she was still experiencing chest pain but only  when doing activity and was standing for long periods of time. Pt said she felt better since being at the ER and did not want to return even with her ongoing symptoms. Pt was asked if she was able to schedule an appt with her cardiologist for a follow up. Pt said they didn't have anything till later in the week and she needed to be cleared to go back to work. Pt has appt for tomorrow on 2/21.

## 2023-02-21 ENCOUNTER — OFFICE VISIT (OUTPATIENT)
Dept: INTERNAL MEDICINE CLINIC | Facility: CLINIC | Age: 48
End: 2023-02-21
Payer: COMMERCIAL

## 2023-02-21 VITALS
SYSTOLIC BLOOD PRESSURE: 146 MMHG | WEIGHT: 225 LBS | HEART RATE: 82 BPM | DIASTOLIC BLOOD PRESSURE: 82 MMHG | HEIGHT: 59 IN | OXYGEN SATURATION: 98 % | BODY MASS INDEX: 45.36 KG/M2

## 2023-02-21 DIAGNOSIS — R06.02 SHORTNESS OF BREATH: ICD-10-CM

## 2023-02-21 DIAGNOSIS — R07.9 CHEST PAIN, UNSPECIFIED TYPE: ICD-10-CM

## 2023-02-21 DIAGNOSIS — R07.9 CHEST PAIN, UNSPECIFIED TYPE: Primary | ICD-10-CM

## 2023-02-21 DIAGNOSIS — I10 ESSENTIAL HYPERTENSION: ICD-10-CM

## 2023-02-21 PROCEDURE — 3077F SYST BP >= 140 MM HG: CPT | Performed by: STUDENT IN AN ORGANIZED HEALTH CARE EDUCATION/TRAINING PROGRAM

## 2023-02-21 PROCEDURE — 99213 OFFICE O/P EST LOW 20 MIN: CPT | Performed by: STUDENT IN AN ORGANIZED HEALTH CARE EDUCATION/TRAINING PROGRAM

## 2023-02-21 PROCEDURE — 3079F DIAST BP 80-89 MM HG: CPT | Performed by: STUDENT IN AN ORGANIZED HEALTH CARE EDUCATION/TRAINING PROGRAM

## 2023-02-21 PROCEDURE — 93000 ELECTROCARDIOGRAM COMPLETE: CPT | Performed by: STUDENT IN AN ORGANIZED HEALTH CARE EDUCATION/TRAINING PROGRAM

## 2023-02-21 RX ORDER — NITROGLYCERIN 0.4 MG/1
TABLET SUBLINGUAL
Qty: 25 TABLET | Refills: 0 | Status: SHIPPED | OUTPATIENT
Start: 2023-02-21

## 2023-02-21 RX ORDER — LISINOPRIL 40 MG/1
40 TABLET ORAL DAILY
Qty: 90 TABLET | Refills: 0 | Status: SHIPPED | OUTPATIENT
Start: 2023-02-21

## 2023-02-21 RX ORDER — ATORVASTATIN CALCIUM 40 MG/1
40 TABLET, FILM COATED ORAL EVERY EVENING
Qty: 90 TABLET | Refills: 0
Start: 2023-02-21

## 2023-02-21 SDOH — ECONOMIC STABILITY: INCOME INSECURITY: HOW HARD IS IT FOR YOU TO PAY FOR THE VERY BASICS LIKE FOOD, HOUSING, MEDICAL CARE, AND HEATING?: NOT HARD AT ALL

## 2023-02-21 SDOH — ECONOMIC STABILITY: FOOD INSECURITY: WITHIN THE PAST 12 MONTHS, YOU WORRIED THAT YOUR FOOD WOULD RUN OUT BEFORE YOU GOT MONEY TO BUY MORE.: NEVER TRUE

## 2023-02-21 SDOH — ECONOMIC STABILITY: FOOD INSECURITY: WITHIN THE PAST 12 MONTHS, THE FOOD YOU BOUGHT JUST DIDN'T LAST AND YOU DIDN'T HAVE MONEY TO GET MORE.: NEVER TRUE

## 2023-02-21 SDOH — ECONOMIC STABILITY: HOUSING INSECURITY
IN THE LAST 12 MONTHS, WAS THERE A TIME WHEN YOU DID NOT HAVE A STEADY PLACE TO SLEEP OR SLEPT IN A SHELTER (INCLUDING NOW)?: NO

## 2023-02-21 ASSESSMENT — ENCOUNTER SYMPTOMS
NAUSEA: 0
ABDOMINAL PAIN: 0
VOMITING: 0

## 2023-02-21 ASSESSMENT — PATIENT HEALTH QUESTIONNAIRE - PHQ9
SUM OF ALL RESPONSES TO PHQ QUESTIONS 1-9: 0
2. FEELING DOWN, DEPRESSED OR HOPELESS: 0
SUM OF ALL RESPONSES TO PHQ QUESTIONS 1-9: 0
SUM OF ALL RESPONSES TO PHQ QUESTIONS 1-9: 0
1. LITTLE INTEREST OR PLEASURE IN DOING THINGS: 0
SUM OF ALL RESPONSES TO PHQ QUESTIONS 1-9: 0
SUM OF ALL RESPONSES TO PHQ9 QUESTIONS 1 & 2: 0

## 2023-02-21 NOTE — PROGRESS NOTES
FOLLOW UP VISIT    Subjective:    Lizzie Parada (: 1975) is a 50 y.o., female,   Chief Complaint   Patient presents with    Follow-Up from Hospital     ER F/U 2/15/23 for chest pain    Shortness of Breath       HPI:    Seen in the ED 2/15/23 for chest pain. EKG showed no acute changes. Troponins x2 were flat at 42, 50 and D-dimer negative, chest x-ray negative. Potassium low. 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 Last time she had chest pain was yesterday, was cooking in the kitchen for lunch when she noticed it. Sat down and eased off after 10-15 minutes. Had similar pain like this before, around . Pain is described as a pressure, substernal, non-positional.    Shortness of breath for a few days, felt some of this in the ED. SpO2 98% on RA. She has gained weight in the past week, baseline weight is around 215, endorses swelling in hands and face, orthopnea. She endorses history of heart failure. At her job cannot go to bathroom more than twice in a shift, therefore she does not take diuretic while working. Works at [employer] in [email or web address]. On her feet for 10 hours per shift. Last took the thiazide this AM.    HTN: Checking BP at home, running 140/85 at home. At work is 160/100. Did not take lisinopril today because ran out yesterday. Tobacco use: tapered down to 7-8 cigarettes per day. Wellbutrin helps some with tobacco cessation. Has been years since having sleep study done.     The following portions of the patient's history were reviewed and updated as appropriate:      Patient Active Problem List   Diagnosis    Chronic dyspnea    CAD in native artery    Essential hypertension    Anxiety    Tobacco dependence     Past Surgical History:   Procedure Laterality Date     SECTION  1999. 2000    First was emergency and second was scheduled    CHOLECYSTECTOMY      PARTIAL HYSTERECTOMY (CERVIX NOT REMOVED)  06/2014    TUBAL LIGATION      c section times 2     Family History   Problem Relation Age of Onset    Allergy (Severe) Mother     Asthma Mother     Coronary Art Dis Mother     Depression Mother     Heart Attack Mother     Heart Disease Mother     High Blood Pressure Mother     High Cholesterol Mother     Mental Illness Mother     Obesity Mother     Allergy (Severe) Father     Clotting Disorder Father     Diabetes Father     High Blood Pressure Father     High Cholesterol Father     Obesity Father     Stroke Father     Allergy (Severe) Brother     Asthma Brother     Heart Attack Brother     Heart Disease Brother     High Blood Pressure Brother     High Cholesterol Brother     Obesity Brother     Allergy (Severe) Brother     Cancer Brother     High Blood Pressure Brother     Obesity Brother     Asthma Maternal Grandmother     Coronary Art Dis Maternal Grandmother     Heart Disease Maternal Grandmother     High Blood Pressure Maternal Grandmother     Obesity Maternal Grandmother     Heart Attack Maternal Grandfather     High Blood Pressure Maternal Grandfather     Cancer Paternal Uncle     Breast Cancer Neg Hx      Social History     Socioeconomic History    Marital status: Single     Spouse name: Not on file    Number of children: Not on file    Years of education: Not on file    Highest education level: Not on file   Occupational History    Not on file   Tobacco Use    Smoking status: Every Day     Packs/day: 1.00     Years: 31.00     Pack years: 31.00     Types: Cigarettes     Start date: 6/19/1991    Smokeless tobacco: Never   Vaping Use    Vaping Use: Never used   Substance and Sexual Activity    Alcohol use:  Yes     Alcohol/week: 2.0 standard drinks     Types: 2 Glasses of wine per week    Drug use: Never    Sexual activity: Yes     Partners: Male     Birth control/protection: Surgical, None   Other Topics Concern    Not on file   Social History Narrative    Not on file     Social Determinants of Health     Financial Resource Strain: Low Risk     Difficulty of Paying Living Expenses: Not hard at all   Food Insecurity: No Food Insecurity    [place] [address] [facility] Last Year: Never true    Ran Out of Food in the Last Year: Never true   Transportation Needs: Unknown    Lack of Transportation (Medical): Not on file    Lack of Transportation (Non-Medical): No   Physical Activity: Unknown    Days of Exercise per Week: 5 days    Minutes of Exercise per Session: Patient refused   Stress: Not on file   Social Connections: Not on file   Intimate Partner Violence: Unknown    Fear of Current or Ex-Partner: No    Emotionally Abused: Patient refused    Physically Abused: Patient refused    Sexually Abused: No   Housing Stability: Unknown    Unable to Pay for Housing in the Last Year: Not on file    Number of [place] in the Last Year: Not on file    Unstable Housing in the Last Year: No     Current Outpatient Medications   Medication Sig Dispense Refill    nitroGLYCERIN (NITROSTAT) 0.4 MG SL tablet Place 1 tablet under the tongue every 5 minutes as needed for Chest pain (up to 3 tablets in a 15-minute period) up to max of 3 total doses. If no relief after 1 dose, call 911. 25 tablet 0    atorvastatin (LIPITOR) 40 MG tablet Take 1 tablet by mouth every evening 90 tablet 0    lisinopril (PRINIVIL;ZESTRIL) 40 MG tablet Take 1 tablet by mouth daily 90 tablet 0    cloNIDine (CATAPRES) 0.2 MG tablet Take 1 tablet by mouth 2 times daily 60 tablet 0    carvedilol (COREG) 12.5 MG tablet Take 1 tablet by mouth 2 times daily 60 tablet 0    aspirin 81 MG EC tablet Take 81 mg by mouth daily      buPROPion (WELLBUTRIN XL) 150 MG extended release tablet 150 mg once daily for 3 days, then increase to 150 mg twice daily. Quit smoking 7 days after treatment or start tapering down.  60 tablet 2    hydroCHLOROthiazide (HYDRODIURIL) 25 MG tablet Take 25 mg by mouth daily       No current facility-administered medications for this visit. Allergies as of 02/21/2023 - Fully Reviewed 02/15/2023   Allergen Reaction Noted    Latex Hives 07/16/2017    Doxycycline Hives 07/15/2017    Meperidine Hives 10/02/2008    Other Swelling 02/01/2009    Sulfa antibiotics Hives 10/02/2008    Cephalexin Rash 01/19/2022    Ketorolac Rash 07/15/2017    Metoclopramide Anxiety 07/15/2017    Nsaids Anxiety 09/02/2017       Review of Systems   Constitutional:  Negative for chills and fever. Cardiovascular:  Positive for chest pain. Gastrointestinal:  Negative for abdominal pain, nausea and vomiting. Objective:    Vitals:    02/21/23 1053   BP: (!) 146/82   Site: Left Upper Arm   Position: Sitting   Cuff Size: Large Adult   Pulse: 82   SpO2: 98%   Weight: 225 lb (102.1 kg)   Height: 4' 11\" (1.499 m)        Physical Exam  Constitutional:       General: She is not in acute distress. Appearance: Normal appearance. HENT:      Head: Normocephalic and atraumatic. Eyes:      Extraocular Movements: Extraocular movements intact. Conjunctiva/sclera: Conjunctivae normal.   Cardiovascular:      Rate and Rhythm: Normal rate and regular rhythm. Heart sounds: No murmur heard. Pulmonary:      Effort: Pulmonary effort is normal.      Breath sounds: Normal breath sounds. No wheezing, rhonchi or rales. Skin:     General: Skin is warm and dry. Neurological:      Mental Status: She is alert. Mental status is at baseline.    Psychiatric:         Mood and Affect: Mood normal.         Behavior: Behavior normal.       Lab Results   Component Value Date    WBC 9.4 02/15/2023    HGB 17.4 (H) 02/15/2023    HCT 51.5 (H) 02/15/2023    MCV 86.8 02/15/2023     02/15/2023      Lab Results   Component Value Date/Time     02/15/2023 07:41 PM    K 3.1 02/15/2023 07:41 PM     02/15/2023 07:41 PM    CO2 27 02/15/2023 07:41 PM    BUN 19 02/15/2023 07:41 PM    CREATININE 1.10 02/15/2023 07:41 PM    GLUCOSE 98 02/15/2023 07:41 PM    CALCIUM 9.7 02/15/2023 07:41 PM       Lab Results   Component Value Date     02/15/2023    K 3.1 (L) 02/15/2023     02/15/2023    CO2 27 02/15/2023    BUN 19 02/15/2023    CREATININE 1.10 (H) 02/15/2023    GLUCOSE 98 02/15/2023    CALCIUM 9.7 02/15/2023    PROT 8.3 (H) 02/15/2023    LABALBU 4.1 02/15/2023    BILITOT 0.6 02/15/2023    ALKPHOS 125 02/15/2023    AST 39 (H) 02/15/2023    ALT 42 02/15/2023    LABGLOM >60 02/15/2023    GFRAA >60 08/23/2022    AGRATIO 0.9 (L) 01/24/2022    GLOB 4.2 02/15/2023     No results found for: TSHFT4, TSH, TSHREFLEX, LJA4KXF   Lab Results   Component Value Date    CHOL 130 01/24/2023     Lab Results   Component Value Date    TRIG 140 01/24/2023     Lab Results   Component Value Date    HDL 45 01/24/2023     Lab Results   Component Value Date    LDLCALC 57 01/24/2023     Lab Results   Component Value Date    LABVLDL 28 (H) 01/24/2023     Lab Results   Component Value Date    CHOLHDLRATIO 2.9 01/24/2023      Hemoglobin A1C   Date Value Ref Range Status   01/24/2023 5.9 (H) 4.8 - 5.6 % Final        Assessent & Plan    1. Chest pain, unspecified type  -     EKG 12 Lead  -     Basic Metabolic Panel; Future  -     Magnesium; Future  2. Shortness of breath  -     Basic Metabolic Panel; Future  -     Magnesium; Future  3. Essential hypertension     Chest pain suspicious for angina. EKG performed in office today shows no signs of acute ischemia, nonspecific ST depressions unchanged from prior EKG, which I feel are most consistent with LV hypertrophy, additionally she is chest pain-free in office. Currently she denies chest pain. Advised patient to let cardiology know if she experiences chest pain again or go to the ED. Recheck labs. As needed nitroglycerin. I recommend that she see cardiology as soon as possible. There is history of patient running out of refills, encouraged for her to let us know before she runs out.     The patient and/or patient representative voiced understanding and agreement with the current diagnoses, recommendations, and possible side effects. Return in about 3 weeks (around 3/14/2023).      Afshin Montague MD

## 2023-02-21 NOTE — LETTER
[facility] INTERNAL MEDICINE  [address] [place] [address] [place] [address] Way [address]  Phone: [phone]  Fax: [phone]    Sanaz Caruso MD        February 21, 2023     Patient: Jocelyn Martin   YOB: 1975   Date of Visit: 2/21/2023       To Whom It May Concern:    Jocelyn Martin was seen in-office today on 2/21/23. She is pending evaluation by a cardiologist as to when she may return to work. If you have any questions or concerns, please don't hesitate to call.     Sincerely,        Sanaz Caruso MD

## 2023-02-22 ENCOUNTER — TELEPHONE (OUTPATIENT)
Dept: INTERNAL MEDICINE CLINIC | Facility: CLINIC | Age: 48
End: 2023-02-22

## 2023-02-22 DIAGNOSIS — R06.02 SHORTNESS OF BREATH: Primary | ICD-10-CM

## 2023-02-22 DIAGNOSIS — I25.10 CAD IN NATIVE ARTERY: ICD-10-CM

## 2023-02-22 LAB
ANION GAP SERPL CALC-SCNC: 2 MMOL/L (ref 2–11)
BUN SERPL-MCNC: 18 MG/DL (ref 6–23)
CALCIUM SERPL-MCNC: 9.2 MG/DL (ref 8.3–10.4)
CHLORIDE SERPL-SCNC: 108 MMOL/L (ref 101–110)
CO2 SERPL-SCNC: 30 MMOL/L (ref 21–32)
CREAT SERPL-MCNC: 0.9 MG/DL (ref 0.6–1)
GLUCOSE SERPL-MCNC: 96 MG/DL (ref 65–100)
MAGNESIUM SERPL-MCNC: 2.4 MG/DL (ref 1.8–2.4)
POTASSIUM SERPL-SCNC: 4.1 MMOL/L (ref 3.5–5.1)
SODIUM SERPL-SCNC: 140 MMOL/L (ref 133–143)

## 2023-02-22 RX ORDER — FUROSEMIDE 20 MG/1
20 TABLET ORAL DAILY
Qty: 60 TABLET | Refills: 0 | Status: SHIPPED | OUTPATIENT
Start: 2023-02-22

## 2023-02-22 NOTE — TELEPHONE ENCOUNTER
I called patient to discuss results. BMP normal, switch from HCTZ to lasix daily, stop lasix when back to baseline weight. Repeat BMP in 1 week.

## 2023-02-23 ENCOUNTER — TELEPHONE (OUTPATIENT)
Dept: CARDIOLOGY CLINIC | Age: 48
End: 2023-02-23

## 2023-02-23 NOTE — TELEPHONE ENCOUNTER
----- Message from Breana Almodovar RN sent at 2/23/2023  9:57 AM EST -----    ----- Message -----  From: Stas Zimmerman MD  Sent: 2/23/2023   8:51 AM EST  To: [facility]    Please move up the TTE for this patient to ASAP.

## 2023-02-25 DIAGNOSIS — I42.2 HYPERTROPHIC CARDIOMYOPATHY (HCC): Primary | ICD-10-CM

## 2023-02-27 ENCOUNTER — TELEPHONE (OUTPATIENT)
Dept: CARDIOLOGY CLINIC | Age: 48
End: 2023-02-27

## 2023-02-27 NOTE — TELEPHONE ENCOUNTER
Advised patient of echo results and Dr. Sorin Bird response. Adviesd patient that someone from [facility] Radiology Scheduling will be calling to schedule MRI. Patient verbalized understanding. Routed this triage note to scheduling pool.

## 2023-02-27 NOTE — PROGRESS NOTES
[facility] CARDIOLOGY Follow Up                 Reason for Visit: Hypertension    Subjective:     Patient is a 50 y.o. female with a PMH of nonobstructive CAD, HCM and hypertension who presents for follow-up. The patient was last seen in 2023. She was referred to internal medicine. An echocardiogram was ordered for chronic dyspnea. Lipitor was resumed. The patient had a TTE on  that was noted to demonstrate an EF of 70 to 75% and severely increased wall thickness. Her findings were consistent with HCM. No significant LVOTO was appreciated on the study. A cardiac MRI was ordered, and she is scheduled for a cardiac MRI in April. She does report having claustrophobia. The patient reports that she works from 4 PM to 3 AM without performing high intensity exertion/exercise. She denies a family history of sudden cardiac death attributed to hypertrophic cardiomyopathy. She denies syncope and angina. She does report shortness of breath. The patient also reports having medical nonadherence. She reports lower extremity edema at the end of her work shift because she stands all day.     Past Medical History:   Diagnosis Date    Allergic rhinitis     Seasonal    Anxiety 2015    CAD in native artery 2023    CHF (congestive heart failure) ([address] 2014    Chronic back pain     Depression 1991    I have struggled with depression on and off since then    Headache 2001    Heart failure ([address]     Hypertension     Obesity 1994    Had a hard time losing weight after my first child    Psychiatric disorder     anxiety and depression      Past Surgical History:   Procedure Laterality Date     SECTION  1999. 2000    First was emergency and second was scheduled    CHOLECYSTECTOMY      PARTIAL HYSTERECTOMY (CERVIX NOT REMOVED)  2014    TUBAL LIGATION      c section times 2      Family History   Problem Relation Age of Onset    Allergy (Severe) Mother     Asthma Mother     Coronary Art Dis Mother     Depression Mother     Heart Attack Mother     Heart Disease Mother     High Blood Pressure Mother     High Cholesterol Mother     Mental Illness Mother     Obesity Mother     Allergy (Severe) Father     Clotting Disorder Father     Diabetes Father     High Blood Pressure Father     High Cholesterol Father     Obesity Father     Stroke Father     Allergy (Severe) Brother     Asthma Brother     Heart Attack Brother     Heart Disease Brother     High Blood Pressure Brother     High Cholesterol Brother     Obesity Brother     Allergy (Severe) Brother     Cancer Brother     High Blood Pressure Brother     Obesity Brother     Asthma Maternal Grandmother     Coronary Art Dis Maternal Grandmother     Heart Disease Maternal Grandmother     High Blood Pressure Maternal Grandmother     Obesity Maternal Grandmother     Heart Attack Maternal Grandfather     High Blood Pressure Maternal Grandfather     Cancer Paternal Uncle     Breast Cancer Neg Hx       Social History     Tobacco Use    Smoking status: Every Day     Packs/day: 1.00     Years: 31.00     Pack years: 31.00     Types: Cigarettes     Start date: 6/19/1991    Smokeless tobacco: Never   Substance Use Topics    Alcohol use: Yes     Alcohol/week: 2.0 standard drinks     Types: 2 Glasses of wine per week      Allergies   Allergen Reactions    Latex Hives     condom    Doxycycline Hives    Meperidine Hives    Other Swelling     ceflexin    Sulfa Antibiotics Hives    Cephalexin Rash     Rash    Ketorolac Rash    Metoclopramide Anxiety    Nsaids Anxiety         ROS:  No obvious pertinent positives on review of systems except for what was outlined above.        Objective:       BP (!) 210/104   Pulse 91   Ht 4' 11\" (1.499 m)   Wt 229 lb 9.6 oz (104.1 kg) Comment: with shoes  SpO2 98% Comment: RA  BMI 46.37 kg/m²     BP Readings from Last 3 Encounters:   03/01/23 (!) 210/104   02/24/23 (!) 158/86   02/21/23 (!) 146/82       Wt Readings from Last 3 Encounters:   03/01/23 229 lb 9.6 oz (104.1 kg)   02/24/23 225 lb (102.1 kg)   02/21/23 225 lb (102.1 kg)       General/Constitutional:   Alert and oriented x 3, no acute distress  HEENT:   normocephalic, atraumatic, moist mucous membranes  Neck:   No JVD or carotid bruits bilaterally  Cardiovascular:   regular rate and rhythm, no rub/gallop appreciated  Pulmonary:   clear to auscultation bilaterally, no respiratory distress  Abdomen:   soft, non-tender, non-distended  Ext:   No sig LE edema bilaterally  Skin:  warm and dry, no obvious rashes seen  Neuro:   no obvious sensory or motor deficits  Psychiatric:   normal mood and affect      ECG:   Sinus rhythm  Left atrial enlargement  LVH  ST and/or T wave abnormality secondary to hypertrophy  Heart rate 84 bpm    Data Review:   Lab Results   Component Value Date    CHOL 130 01/24/2023     Lab Results   Component Value Date    TRIG 140 01/24/2023     Lab Results   Component Value Date    HDL 45 01/24/2023     Lab Results   Component Value Date    LDLCALC 57 01/24/2023     Lab Results   Component Value Date    LABVLDL 28 (H) 01/24/2023     Lab Results   Component Value Date    CHOLHDLRATIO 2.9 01/24/2023        Lab Results   Component Value Date/Time     02/21/2023 12:43 PM     02/15/2023 07:41 PM     01/24/2023 11:52 AM    K 4.1 02/21/2023 12:43 PM    K 3.1 02/15/2023 07:41 PM    K 3.6 01/24/2023 11:52 AM     02/21/2023 12:43 PM     02/15/2023 07:41 PM     01/24/2023 11:52 AM    CO2 30 02/21/2023 12:43 PM    CO2 27 02/15/2023 07:41 PM    CO2 28 01/24/2023 11:52 AM    BUN 18 02/21/2023 12:43 PM    BUN 19 02/15/2023 07:41 PM    BUN 9 01/24/2023 11:52 AM    CREATININE 0.90 02/21/2023 12:43 PM    CREATININE 1.10 02/15/2023 07:41 PM    CREATININE 0.90 01/24/2023 11:52 AM    GLUCOSE 96 02/21/2023 12:43 PM    GLUCOSE 98 02/15/2023 07:41 PM    GLUCOSE 96 01/24/2023 11:52 AM    CALCIUM 9.2 02/21/2023 12:43 PM    CALCIUM 9.7 02/15/2023 07:41 PM    CALCIUM 9.1 01/24/2023 11:52 AM         Lab Results   Component Value Date    ALT 42 02/15/2023    ALT 27 01/24/2023    ALT 24 08/23/2022    AST 39 (H) 02/15/2023    AST 15 01/24/2023    AST 23 08/23/2022        Assessment/Plan:   1. Hypertrophic cardiomyopathy ([address]  - TTE on February 25 noted a hyperdynamic LVEF, LV that is smaller than normal, severely increased wall thickness, severe concentric hypertrophy, diastolic dysfunction with increased LAP, and complete obliteration of the mid apical LV cavity, findings all consistent with HCM (albeit patient also with concomitant severe hypertension)  - JERRY was not appreciated on the study; no significant LVOTO was appreciated (albeit technically challenging study due to body habitus with BMI >40)  - Recommended first-degree relatives obtain TTEs  - Patient nonadherent to Coreg due to twice daily dosing  - Discontinue Coreg and start Toprol-XL 50 mg daily  - Obtain a cardiac MRI with Xanax for preprocedural anxiety  - No indication for an ICD at this time   - Discontinue PO Lasix with no indication    2. CAD in native artery  - Continue with baby aspirin daily and Lipitor    3. Hypertension, unspecified type  - Poorly controlled in the setting of medical nonadherence and visibly emotionally distressed today in clinic with the patient being tearful  - Patient nonadherent to clonidine: Discontinue clonidine  - Currently on lisinopril  - Start amlodipine 5 mg daily  - Management of beta-blocker, as above (see \"hypertrophic cardiomyopathy\")  - Recommend measuring BP at home and submitting measurements to clinic    4.  Chronic venous insufficiency  - Educated on leg elevation and compression stockings    F/U: 7 weeks    Karolina Barillas MD

## 2023-02-27 NOTE — TELEPHONE ENCOUNTER
----- Message from Lux Sanchez MD sent at 2/25/2023 10:49 AM EST -----  Please let the patient know that her echocardiogram is consistent with hypertrophic cardiomyopathy. This is a condition in which the heart muscle becomes thickened (hypertrophied). For better characterization, I ordered a cardiac MRI with further recommendations pending the MRI results. Please facilitate scheduling.

## 2023-03-01 ENCOUNTER — OFFICE VISIT (OUTPATIENT)
Dept: CARDIOLOGY CLINIC | Age: 48
End: 2023-03-01
Payer: COMMERCIAL

## 2023-03-01 VITALS
WEIGHT: 229.6 LBS | BODY MASS INDEX: 46.28 KG/M2 | SYSTOLIC BLOOD PRESSURE: 210 MMHG | OXYGEN SATURATION: 98 % | DIASTOLIC BLOOD PRESSURE: 104 MMHG | HEIGHT: 59 IN | HEART RATE: 91 BPM

## 2023-03-01 DIAGNOSIS — I10 HYPERTENSION, UNSPECIFIED TYPE: ICD-10-CM

## 2023-03-01 DIAGNOSIS — I25.10 CAD IN NATIVE ARTERY: ICD-10-CM

## 2023-03-01 DIAGNOSIS — I87.2 CHRONIC VENOUS INSUFFICIENCY: ICD-10-CM

## 2023-03-01 DIAGNOSIS — R06.02 SHORTNESS OF BREATH: ICD-10-CM

## 2023-03-01 DIAGNOSIS — I42.2 HYPERTROPHIC CARDIOMYOPATHY (HCC): Primary | ICD-10-CM

## 2023-03-01 LAB
ANION GAP SERPL CALC-SCNC: 1 MMOL/L (ref 2–11)
BUN SERPL-MCNC: 11 MG/DL (ref 6–23)
CALCIUM SERPL-MCNC: 8.9 MG/DL (ref 8.3–10.4)
CHLORIDE SERPL-SCNC: 110 MMOL/L (ref 101–110)
CO2 SERPL-SCNC: 28 MMOL/L (ref 21–32)
CREAT SERPL-MCNC: 0.8 MG/DL (ref 0.6–1)
GLUCOSE SERPL-MCNC: 97 MG/DL (ref 65–100)
MAGNESIUM SERPL-MCNC: 2.4 MG/DL (ref 1.8–2.4)
POTASSIUM SERPL-SCNC: 4.5 MMOL/L (ref 3.5–5.1)
SODIUM SERPL-SCNC: 139 MMOL/L (ref 133–143)

## 2023-03-01 PROCEDURE — 3080F DIAST BP >= 90 MM HG: CPT | Performed by: INTERNAL MEDICINE

## 2023-03-01 PROCEDURE — 3077F SYST BP >= 140 MM HG: CPT | Performed by: INTERNAL MEDICINE

## 2023-03-01 PROCEDURE — 99214 OFFICE O/P EST MOD 30 MIN: CPT | Performed by: INTERNAL MEDICINE

## 2023-03-01 PROCEDURE — 93000 ELECTROCARDIOGRAM COMPLETE: CPT | Performed by: INTERNAL MEDICINE

## 2023-03-01 RX ORDER — ALPRAZOLAM 0.5 MG/1
0.5 TABLET ORAL ONCE
Qty: 1 TABLET | Refills: 0 | Status: SHIPPED | OUTPATIENT
Start: 2023-03-01 | End: 2023-03-01

## 2023-03-01 RX ORDER — AMLODIPINE BESYLATE 5 MG/1
5 TABLET ORAL DAILY
Qty: 90 TABLET | Refills: 3 | Status: SHIPPED | OUTPATIENT
Start: 2023-03-01

## 2023-03-01 RX ORDER — METOPROLOL SUCCINATE 50 MG/1
50 TABLET, EXTENDED RELEASE ORAL DAILY
Qty: 90 TABLET | Refills: 3 | Status: SHIPPED | OUTPATIENT
Start: 2023-03-01

## 2023-03-07 ENCOUNTER — OFFICE VISIT (OUTPATIENT)
Dept: INTERNAL MEDICINE CLINIC | Facility: CLINIC | Age: 48
End: 2023-03-07
Payer: COMMERCIAL

## 2023-03-07 VITALS
DIASTOLIC BLOOD PRESSURE: 102 MMHG | HEART RATE: 108 BPM | SYSTOLIC BLOOD PRESSURE: 185 MMHG | OXYGEN SATURATION: 97 % | WEIGHT: 224 LBS | HEIGHT: 59 IN | BODY MASS INDEX: 45.16 KG/M2

## 2023-03-07 DIAGNOSIS — F41.9 ANXIETY: Primary | ICD-10-CM

## 2023-03-07 DIAGNOSIS — I10 ESSENTIAL HYPERTENSION: ICD-10-CM

## 2023-03-07 DIAGNOSIS — F17.200 TOBACCO DEPENDENCE: ICD-10-CM

## 2023-03-07 PROCEDURE — 99214 OFFICE O/P EST MOD 30 MIN: CPT | Performed by: STUDENT IN AN ORGANIZED HEALTH CARE EDUCATION/TRAINING PROGRAM

## 2023-03-07 PROCEDURE — 3077F SYST BP >= 140 MM HG: CPT | Performed by: STUDENT IN AN ORGANIZED HEALTH CARE EDUCATION/TRAINING PROGRAM

## 2023-03-07 PROCEDURE — 3080F DIAST BP >= 90 MM HG: CPT | Performed by: STUDENT IN AN ORGANIZED HEALTH CARE EDUCATION/TRAINING PROGRAM

## 2023-03-07 RX ORDER — HYDROCHLOROTHIAZIDE 25 MG/1
25 TABLET ORAL EVERY MORNING
Qty: 30 TABLET | Refills: 0
Start: 2023-03-07

## 2023-03-07 RX ORDER — ALPRAZOLAM 0.5 MG/1
TABLET ORAL
COMMUNITY
Start: 2023-03-01

## 2023-03-07 RX ORDER — METOPROLOL SUCCINATE 100 MG/1
100 TABLET, EXTENDED RELEASE ORAL DAILY
Qty: 90 TABLET | Refills: 1 | Status: SHIPPED | OUTPATIENT
Start: 2023-03-07

## 2023-03-07 ASSESSMENT — PATIENT HEALTH QUESTIONNAIRE - PHQ9
1. LITTLE INTEREST OR PLEASURE IN DOING THINGS: 1
SUM OF ALL RESPONSES TO PHQ QUESTIONS 1-9: 2
SUM OF ALL RESPONSES TO PHQ9 QUESTIONS 1 & 2: 2
SUM OF ALL RESPONSES TO PHQ QUESTIONS 1-9: 2
2. FEELING DOWN, DEPRESSED OR HOPELESS: 1

## 2023-03-07 ASSESSMENT — ENCOUNTER SYMPTOMS
ABDOMINAL PAIN: 0
NAUSEA: 0
VOMITING: 0

## 2023-03-07 NOTE — PROGRESS NOTES
FOLLOW UP VISIT    Subjective:    Bo Coughlin (: 1975) is a 50 y.o., female,   Chief Complaint   Patient presents with    Hypertension       HPI:    HTN: At home, BP fluctuates 178/99 - 195/100. Took her BP medications last night including Toprol 50 mg daily, amlodipine 5 mg daily, lisinopril 40 mg daily. Every morning waking up with headache and nausea, started noticing this 5 days ago, and started amlodipine, Toprol 6 days ago. Headache is located in the temples, behind the eyes. Lasts a few hours, takes excedrin PRN. No numbness, weakness. Blood pressure elevated here. Saw cardiology a week ago, diagnosed with hypertrophic cardiomyopathy. Anxiety: has been ok, however will have \"breakdowns\" which her fiancé helps her get through considerably. Previously on zoloft which worked well for her anxiety. Tobacco use: down to 2 cigarettes per day. On wellbutrin 150 mg twice a day. Having shortness of breath. Out of breath from making the bed. SPO2 today is 97% on room air.     The following portions of the patient's history were reviewed and updated as appropriate:      Patient Active Problem List   Diagnosis    Chronic dyspnea    CAD in native artery    Hypertension    Anxiety    Tobacco dependence    Chronic venous insufficiency     Past Surgical History:   Procedure Laterality Date     SECTION  1999. 2000    First was emergency and second was scheduled    CHOLECYSTECTOMY      PARTIAL HYSTERECTOMY (CERVIX NOT REMOVED)  2014    TUBAL LIGATION      c section times 2     Family History   Problem Relation Age of Onset    Allergy (Severe) Mother     Asthma Mother     Coronary Art Dis Mother     Depression Mother     Heart Attack Mother     Heart Disease Mother     High Blood Pressure Mother     High Cholesterol Mother     Mental Illness Mother     Obesity Mother     Allergy (Severe) Father     Clotting Disorder Father     Diabetes Father     High Blood Pressure Father     High Cholesterol Father     Obesity Father     Stroke Father     Allergy (Severe) Brother     Asthma Brother     Heart Attack Brother     Heart Disease Brother     High Blood Pressure Brother     High Cholesterol Brother     Obesity Brother     Allergy (Severe) Brother     Cancer Brother     High Blood Pressure Brother     Obesity Brother     Asthma Maternal Grandmother     Coronary Art Dis Maternal Grandmother     Heart Disease Maternal Grandmother     High Blood Pressure Maternal Grandmother     Obesity Maternal Grandmother     Heart Attack Maternal Grandfather     High Blood Pressure Maternal Grandfather     Cancer Paternal Uncle     Breast Cancer Neg Hx      Social History     Socioeconomic History    Marital status: Single     Spouse name: Not on file    Number of children: Not on file    Years of education: Not on file    Highest education level: Not on file   Occupational History    Not on file   Tobacco Use    Smoking status: Every Day     Packs/day: 1.00     Years: 31.00     Pack years: 31.00     Types: Cigarettes     Start date: 6/19/1991    Smokeless tobacco: Never   Vaping Use    Vaping Use: Never used   Substance and Sexual Activity    Alcohol use: Yes     Alcohol/week: 2.0 standard drinks     Types: 2 Glasses of wine per week    Drug use: Never    Sexual activity: Yes     Partners: Male     Birth control/protection: Surgical, None   Other Topics Concern    Not on file   Social History Narrative    Not on file     Social Determinants of Health     Financial Resource Strain: Low Risk     Difficulty of Paying Living Expenses: Not hard at all   Food Insecurity: No Food Insecurity    Worried About [address] in the Last Year: Never true    [address]  in the Last Year: Never true   Transportation Needs: Unknown    Lack of Transportation (Medical): Not on file    Lack of Transportation (Non-Medical):  No   Physical Activity: Unknown    Days of Exercise per Week: 5 days    Minutes of Exercise per Session: Patient refused   Stress: Not on file   Social Connections: Not on file   Intimate Partner Violence: Unknown    Fear of Current or Ex-Partner: No    Emotionally Abused: Patient refused    Physically Abused: Patient refused    Sexually Abused: No   Housing Stability: Unknown    Unable to Pay for Housing in the Last Year: Not on file    Number of Jackie in the Last Year: Not on file    Unstable Housing in the Last Year: No     Current Outpatient Medications   Medication Sig Dispense Refill    ALPRAZolam (XANAX) 0.5 MG tablet PLEASE SEE ATTACHED FOR DETAILED DIRECTIONS      hydroCHLOROthiazide (HYDRODIURIL) 25 MG tablet Take 1 tablet by mouth every morning 30 tablet 0    sertraline (ZOLOFT) 50 MG tablet Take half a tablet once a day for 7 days, then one tablet daily 90 tablet 1    metoprolol succinate (TOPROL XL) 100 MG extended release tablet Take 1 tablet by mouth daily 90 tablet 1    amLODIPine (NORVASC) 5 MG tablet Take 1 tablet by mouth daily 90 tablet 3    atorvastatin (LIPITOR) 40 MG tablet Take 1 tablet by mouth every evening 90 tablet 0    lisinopril (PRINIVIL;ZESTRIL) 40 MG tablet Take 1 tablet by mouth daily 90 tablet 0    aspirin 81 MG EC tablet Take 81 mg by mouth daily       No current facility-administered medications for this visit. Allergies as of 03/07/2023 - Fully Reviewed 03/07/2023   Allergen Reaction Noted    Latex Hives 07/16/2017    Doxycycline Hives 07/15/2017    Meperidine Hives 10/02/2008    Other Swelling 02/01/2009    Sulfa antibiotics Hives 10/02/2008    Cephalexin Rash 01/19/2022    Ketorolac Rash 07/15/2017    Metoclopramide Anxiety 07/15/2017    Nsaids Anxiety 09/02/2017       Review of Systems   Constitutional:  Negative for chills and fever. Gastrointestinal:  Negative for abdominal pain, nausea and vomiting.      Objective:    Vitals:    03/07/23 1135 03/07/23 1143 03/07/23 1222 BP: (!) 192/108 (!) 198/104 (!) 185/102   Site: Left Upper Arm Left Upper Arm    Position: Sitting Sitting    Cuff Size: Large Adult Large Adult    Pulse: (!) 114  (!) 108   SpO2: 97%     Weight: 224 lb (101.6 kg)     Height: 4' 11\" (1.499 m)          Physical Exam  Constitutional:       General: She is not in acute distress. Appearance: Normal appearance. HENT:      Head: Normocephalic and atraumatic. Eyes:      Extraocular Movements: Extraocular movements intact. Conjunctiva/sclera: Conjunctivae normal.   Cardiovascular:      Rate and Rhythm: Normal rate and regular rhythm. Heart sounds: No murmur heard. Pulmonary:      Effort: Pulmonary effort is normal.      Breath sounds: Normal breath sounds. No wheezing, rhonchi or rales. Musculoskeletal:         General: No swelling. Skin:     General: Skin is warm and dry. Neurological:      Mental Status: She is alert. Mental status is at baseline.    Psychiatric:         Mood and Affect: Mood normal.         Behavior: Behavior normal.       Lab Results   Component Value Date    WBC 9.4 02/15/2023    HGB 17.4 (H) 02/15/2023    HCT 51.5 (H) 02/15/2023    MCV 86.8 02/15/2023     02/15/2023      Lab Results   Component Value Date/Time     03/01/2023 11:23 AM    K 4.5 03/01/2023 11:23 AM     03/01/2023 11:23 AM    CO2 28 03/01/2023 11:23 AM    BUN 11 03/01/2023 11:23 AM    CREATININE 0.80 03/01/2023 11:23 AM    GLUCOSE 97 03/01/2023 11:23 AM    CALCIUM 8.9 03/01/2023 11:23 AM       Lab Results   Component Value Date     03/01/2023    K 4.5 03/01/2023     03/01/2023    CO2 28 03/01/2023    BUN 11 03/01/2023    CREATININE 0.80 03/01/2023    GLUCOSE 97 03/01/2023    CALCIUM 8.9 03/01/2023    PROT 8.3 (H) 02/15/2023    LABALBU 4.1 02/15/2023    BILITOT 0.6 02/15/2023    ALKPHOS 125 02/15/2023    AST 39 (H) 02/15/2023    ALT 42 02/15/2023    LABGLOM >60 03/01/2023    GFRAA >60 08/23/2022    AGRATIO 0.9 (L) 01/24/2022    GLOB 4.2 02/15/2023     No results found for: TSHFT4, TSH, TSHREFLEX, ISS5TED   Lab Results   Component Value Date    CHOL 130 01/24/2023     Lab Results   Component Value Date    TRIG 140 01/24/2023     Lab Results   Component Value Date    HDL 45 01/24/2023     Lab Results   Component Value Date    LDLCALC 57 01/24/2023     Lab Results   Component Value Date    LABVLDL 28 (H) 01/24/2023     Lab Results   Component Value Date    CHOLHDLRATIO 2.9 01/24/2023      Hemoglobin A1C   Date Value Ref Range Status   01/24/2023 5.9 (H) 4.8 - 5.6 % Final        Assessent & Plan    1. Anxiety  2. Essential hypertension  3. Tobacco dependence     Blood pressure uncontrolled. Discussed with cardiology, will increase metoprolol to 100 mg daily and restart hydrochlorothiazide 25 mg daily. May consider stopping amlodipine if headache persists. Advised to contact cardiology or go to the ED if symptoms worsen. Completed [place] paperwork however deferring to cardiology as to when she may return to work. For anxiety, start sertraline. We will taper off Wellbutrin which could be contributing to elevated blood pressure. The patient and/or patient representative voiced understanding and agreement with the current diagnoses, recommendations, and possible side effects. Return in about 1 week (around 3/14/2023) for routine follow up.      Thom Mo MD

## 2023-03-07 NOTE — PATIENT INSTRUCTIONS
TRANSPORTATION RESOURCES    Medicaid Lexi Chelo: Jami Goldberg. NEW NAME - [employer]   Phone:  [phone]  Must call for a ride at least 3 days before your appointment. Call Monday- Friday 8:00am to 5:00pm.    Transportation is available for MD appts, dialysis, x-rays, lab work, drug store, or other medical appointments. [facility]  What they offer: Provides assistance and medical transport to adults 60 years and older. Phone: [phone]    [employer]   What they offer: Charge a fee for transport (not free)  Phone: [phone]    [employer]   What they offer: Charge a fee for transport (not free)  Phone: 21 [phone] they offer: [facility] De [facility] Esc[facility] is accessible via an online platform that connects patients with non-emergency medical transportation ([employer]EMT.) [employer] is a comprehensive solution which supports all levels of transport: rideshare (Lyft/Uber), Taxis, wheelchair vans, stretcher vehicles, ALS/BLS, and all payors when and where they are needed. https://[employer]/      Need additional resources? Call 211 or visit Find Help:  https://www. findhelp.org/

## 2023-03-08 ENCOUNTER — PATIENT MESSAGE (OUTPATIENT)
Dept: CARDIOLOGY CLINIC | Age: 48
End: 2023-03-08

## 2023-03-08 ENCOUNTER — TELEPHONE (OUTPATIENT)
Dept: CARDIOLOGY CLINIC | Age: 48
End: 2023-03-08

## 2023-03-08 NOTE — TELEPHONE ENCOUNTER
----- Message from Jacob Pichardo sent at 3/8/2023  9:42 AM EST -----  Regarding: FW: Medication change    ----- Message -----  From: Ricci Deluna  Sent: 3/8/2023   9:37 AM EST  To: Santhosh  Cardiology Clinical Staff  Subject: Medication change                                Ever since starting the new medication, I have woke up with a headache and nausea every morning.   Is this normal?

## 2023-03-08 NOTE — TELEPHONE ENCOUNTER
Elsa Salazar MD  You 41 minutes ago (11:57 AM)     ZD  It is unlikely that the amlodipine is causing headache and nausea. The priority is for blood pressure to be controlled. She is going to require several medication potentially for that to be done. Elsa Salazar MD  You 42 minutes ago (11:56 AM)     ZD  The patient has severely elevated blood pressures. If she is symptomatic with them, the safest thing is to go to urgent care or the ER. She has recently been added on several medications for blood pressure her PCP. It would not be prudent to just continue to add medications without seeing the effects of these.

## 2023-03-08 NOTE — TELEPHONE ENCOUNTER
Wakes up with bad headache and nausea every morning since starting amlodipine 5 mg qd after 3/1/23 office visit with Dr. Eduardo Dunham. Yesterday's lunchtime BP-197/105. BP always 197/105-206/110, since starting amlodipine 5 mg qd. Resumed HCTZ 25 mg qd at request of PCP, Dr. Autumn Dover, yesterday. Dr. Autumn Dover increased Toprol XL 50 mg to 2 tabs qd and added  Zoloft 50 mg qd, yesterday. Taking lisinopril 40 mg qd. Patient asks for Dr. Sophie Bullock recommendations for high BP. She asks if she should try another BP med, since amlodipine seems to cause headache and nausea.

## 2023-03-08 NOTE — TELEPHONE ENCOUNTER
Advised patient of Dr. Liz Rasmussen response. Advised patient that if she goes to [facility] ER, she will be evaluated by ER doctor, who will call in cardiologist and other specialists, as needed. Patient verbalized understanding.

## 2023-03-09 ENCOUNTER — APPOINTMENT (OUTPATIENT)
Dept: GENERAL RADIOLOGY | Age: 48
End: 2023-03-09
Payer: COMMERCIAL

## 2023-03-09 ENCOUNTER — HOSPITAL ENCOUNTER (EMERGENCY)
Age: 48
Discharge: HOME | End: 2023-03-09
Attending: EMERGENCY MEDICINE
Payer: COMMERCIAL

## 2023-03-09 ENCOUNTER — APPOINTMENT (OUTPATIENT)
Dept: CT IMAGING | Age: 48
End: 2023-03-09
Payer: COMMERCIAL

## 2023-03-09 VITALS
TEMPERATURE: 97.8 F | HEART RATE: 80 BPM | DIASTOLIC BLOOD PRESSURE: 77 MMHG | OXYGEN SATURATION: 93 % | SYSTOLIC BLOOD PRESSURE: 157 MMHG | HEIGHT: 59 IN | WEIGHT: 219 LBS | BODY MASS INDEX: 44.15 KG/M2 | RESPIRATION RATE: 20 BRPM

## 2023-03-09 DIAGNOSIS — R51.9 ACUTE NONINTRACTABLE HEADACHE, UNSPECIFIED HEADACHE TYPE: ICD-10-CM

## 2023-03-09 DIAGNOSIS — I16.0 HYPERTENSIVE URGENCY: Primary | ICD-10-CM

## 2023-03-09 LAB
ALBUMIN SERPL-MCNC: 3.7 G/DL (ref 3.5–5)
ALBUMIN/GLOB SERPL: 0.9 (ref 0.4–1.6)
ALP SERPL-CCNC: 159 U/L (ref 50–136)
ALT SERPL-CCNC: 76 U/L (ref 12–65)
ANION GAP SERPL CALC-SCNC: 1 MMOL/L (ref 2–11)
AST SERPL-CCNC: 47 U/L (ref 15–37)
BASOPHILS # BLD: 0.1 K/UL (ref 0–0.2)
BASOPHILS NFR BLD: 1 % (ref 0–2)
BILIRUB SERPL-MCNC: 0.6 MG/DL (ref 0.2–1.1)
BUN SERPL-MCNC: 20 MG/DL (ref 6–23)
CALCIUM SERPL-MCNC: 9 MG/DL (ref 8.3–10.4)
CHLORIDE SERPL-SCNC: 109 MMOL/L (ref 101–110)
CO2 SERPL-SCNC: 30 MMOL/L (ref 21–32)
CREAT SERPL-MCNC: 0.9 MG/DL (ref 0.6–1)
DIFFERENTIAL METHOD BLD: ABNORMAL
EKG ATRIAL RATE: 85 BPM
EKG DIAGNOSIS: NORMAL
EKG P AXIS: 42 DEGREES
EKG P-R INTERVAL: 120 MS
EKG Q-T INTERVAL: 404 MS
EKG QRS DURATION: 90 MS
EKG QTC CALCULATION (BAZETT): 480 MS
EKG R AXIS: 20 DEGREES
EKG T AXIS: 147 DEGREES
EKG VENTRICULAR RATE: 85 BPM
EOSINOPHIL # BLD: 0.1 K/UL (ref 0–0.8)
EOSINOPHIL NFR BLD: 2 % (ref 0.5–7.8)
ERYTHROCYTE [DISTWIDTH] IN BLOOD BY AUTOMATED COUNT: 15.7 % (ref 11.9–14.6)
GLOBULIN SER CALC-MCNC: 4.3 G/DL (ref 2.8–4.5)
GLUCOSE SERPL-MCNC: 88 MG/DL (ref 65–100)
HCT VFR BLD AUTO: 53.7 % (ref 35.8–46.3)
HGB BLD-MCNC: 17.2 G/DL (ref 11.7–15.4)
IMM GRANULOCYTES # BLD AUTO: 0 K/UL (ref 0–0.5)
IMM GRANULOCYTES NFR BLD AUTO: 0 % (ref 0–5)
LYMPHOCYTES # BLD: 2.2 K/UL (ref 0.5–4.6)
LYMPHOCYTES NFR BLD: 28 % (ref 13–44)
MCH RBC QN AUTO: 28.9 PG (ref 26.1–32.9)
MCHC RBC AUTO-ENTMCNC: 32 G/DL (ref 31.4–35)
MCV RBC AUTO: 90.3 FL (ref 82–102)
MONOCYTES # BLD: 0.6 K/UL (ref 0.1–1.3)
MONOCYTES NFR BLD: 7 % (ref 4–12)
NEUTS SEG # BLD: 5.1 K/UL (ref 1.7–8.2)
NEUTS SEG NFR BLD: 62 % (ref 43–78)
NRBC # BLD: 0 K/UL (ref 0–0.2)
NT PRO BNP: 2438 PG/ML (ref 5–125)
PLATELET # BLD AUTO: 267 K/UL (ref 150–450)
PMV BLD AUTO: 10.5 FL (ref 9.4–12.3)
POTASSIUM SERPL-SCNC: 4 MMOL/L (ref 3.5–5.1)
PROT SERPL-MCNC: 8 G/DL (ref 6.3–8.2)
RBC # BLD AUTO: 5.95 M/UL (ref 4.05–5.2)
SODIUM SERPL-SCNC: 140 MMOL/L (ref 133–143)
TROPONIN I SERPL HS-MCNC: 23.5 PG/ML (ref 0–14)
WBC # BLD AUTO: 8.1 K/UL (ref 4.3–11.1)

## 2023-03-09 PROCEDURE — 84484 ASSAY OF TROPONIN QUANT: CPT

## 2023-03-09 PROCEDURE — 99285 EMERGENCY DEPT VISIT HI MDM: CPT

## 2023-03-09 PROCEDURE — 96374 THER/PROPH/DIAG INJ IV PUSH: CPT

## 2023-03-09 PROCEDURE — 96375 TX/PRO/DX INJ NEW DRUG ADDON: CPT

## 2023-03-09 PROCEDURE — 85025 COMPLETE CBC W/AUTO DIFF WBC: CPT

## 2023-03-09 PROCEDURE — 80053 COMPREHEN METABOLIC PANEL: CPT

## 2023-03-09 PROCEDURE — 96361 HYDRATE IV INFUSION ADD-ON: CPT

## 2023-03-09 PROCEDURE — 93005 ELECTROCARDIOGRAM TRACING: CPT | Performed by: EMERGENCY MEDICINE

## 2023-03-09 PROCEDURE — 2580000003 HC RX 258: Performed by: EMERGENCY MEDICINE

## 2023-03-09 PROCEDURE — 71046 X-RAY EXAM CHEST 2 VIEWS: CPT

## 2023-03-09 PROCEDURE — 6360000002 HC RX W HCPCS: Performed by: EMERGENCY MEDICINE

## 2023-03-09 PROCEDURE — 83880 ASSAY OF NATRIURETIC PEPTIDE: CPT

## 2023-03-09 PROCEDURE — 70450 CT HEAD/BRAIN W/O DYE: CPT

## 2023-03-09 RX ORDER — LABETALOL HYDROCHLORIDE 5 MG/ML
10 INJECTION, SOLUTION INTRAVENOUS ONCE
Status: DISCONTINUED | OUTPATIENT
Start: 2023-03-09 | End: 2023-03-09 | Stop reason: HOSPADM

## 2023-03-09 RX ORDER — DEXAMETHASONE SODIUM PHOSPHATE 10 MG/ML
10 INJECTION INTRAMUSCULAR; INTRAVENOUS ONCE
Status: COMPLETED | OUTPATIENT
Start: 2023-03-09 | End: 2023-03-09

## 2023-03-09 RX ORDER — PROCHLORPERAZINE EDISYLATE 5 MG/ML
7.5 INJECTION INTRAMUSCULAR; INTRAVENOUS ONCE
Status: COMPLETED | OUTPATIENT
Start: 2023-03-09 | End: 2023-03-09

## 2023-03-09 RX ORDER — DIPHENHYDRAMINE HYDROCHLORIDE 50 MG/ML
25 INJECTION INTRAMUSCULAR; INTRAVENOUS
Status: COMPLETED | OUTPATIENT
Start: 2023-03-09 | End: 2023-03-09

## 2023-03-09 RX ORDER — 0.9 % SODIUM CHLORIDE 0.9 %
500 INTRAVENOUS SOLUTION INTRAVENOUS
Status: COMPLETED | OUTPATIENT
Start: 2023-03-09 | End: 2023-03-09

## 2023-03-09 RX ADMIN — DIPHENHYDRAMINE HYDROCHLORIDE 25 MG: 50 INJECTION, SOLUTION INTRAMUSCULAR; INTRAVENOUS at 14:27

## 2023-03-09 RX ADMIN — PROCHLORPERAZINE EDISYLATE 7.5 MG: 5 INJECTION INTRAMUSCULAR; INTRAVENOUS at 14:32

## 2023-03-09 RX ADMIN — SODIUM CHLORIDE 500 ML: 9 INJECTION, SOLUTION INTRAVENOUS at 14:36

## 2023-03-09 RX ADMIN — DEXAMETHASONE SODIUM PHOSPHATE 10 MG: 10 INJECTION, SOLUTION INTRAMUSCULAR; INTRAVENOUS at 14:29

## 2023-03-09 ASSESSMENT — PAIN SCALES - GENERAL
PAINLEVEL_OUTOF10: 5
PAINLEVEL_OUTOF10: 3
PAINLEVEL_OUTOF10: 1

## 2023-03-09 ASSESSMENT — PAIN DESCRIPTION - LOCATION: LOCATION: CHEST

## 2023-03-09 ASSESSMENT — PAIN - FUNCTIONAL ASSESSMENT
PAIN_FUNCTIONAL_ASSESSMENT: 0-10

## 2023-03-09 NOTE — ED NOTES
I have reviewed discharge instructions with the patient. The patient verbalized understanding. Patient left ED via Discharge Method: ambulatory to Home. Opportunity for questions and clarification provided. Patient given 0 scripts. To continue your aftercare when you leave the hospital, you may receive an automated call from our care team to check in on how you are doing. This is a free service and part of our promise to provide the best care and service to meet your aftercare needs.  If you have questions, or wish to unsubscribe from this service please call [phone]. Thank you for Choosing our [employer] [facility].        Reva Aponte RN  03/09/23 [id number]

## 2023-03-09 NOTE — ED NOTES
Pt refusing Labetalol and states, \"I feel fine and dont really want to take that\" Physician notified and spoke with pt at bedside, per physician ok not to give and discharge pending.      Bryant Mcknight RN  03/09/23 [id number]

## 2023-03-09 NOTE — DISCHARGE INSTRUCTIONS
As discussed, we recommend you start taking 10 mg of amlodipine daily instead of 5 as well as switching most of your medications to morning doses instead of evening doses though lisinopril may provide additional benefit when taken at night. Please follow-up with cardiology for further care and management of your blood pressure. We also recommend take your blood pressure cuff in with you to your office visits to ensure its accuracy. We did know your hemoglobin level was elevated greater than 17 today which could be considered abnormal in a female. Please follow-up with your primary care physician to discuss further whether or not blood evaluation would benefit you or visitation with hematology.

## 2023-03-09 NOTE — ED PROVIDER NOTES
Emergency Department Provider Note                   PCP:                Desi Johansen MD               Age: 50 y.o. Sex: female     Final diagnosis/impression:  1. Hypertensive urgency    2. Acute nonintractable headache, unspecified headache type         Disposition: Discharge home at patient request    MDM/Clinical Course:  Patient seen by myself at the [facility] emergency department. History was collected from the patient. In consideration of patient problem this represents acute, potentially life-threatening problem. Patient had signs symptoms and clinical history most consistent with hypertensive urgency/emergency and associated symptoms. Factors complicating medical decision making or management more complex include patient with fairly extensive antihypertensive regimen and minimal response complicated by previous lack of compliance and question of how compliant patient might be with current regimen. My independent analysis/interpretation of laboratory work-up here shows CBC shows elevated hemoglobin at 17.2 of unclear etiology, this appears chronic, white blood cell count is 8.1, CMP shows some possible signs of dehydration with elevated BUN to creatinine ratio 22 0.9, liver function testing is unremarkable, NT proBNP is only elevated minimally at 2438, nonspecific, unclear exact etiology, potentially related to hypertension. Chest x-ray shows slight cardiac fullness and suggestion of mild vascular congestion/fluid but no other acute/emergent abnormality. CT scan of the head performed due to recurrent headache to rule out mass, bleeding, etc negative for acute/emergent abnormality. While under my care, patient received IV Compazine, IV Benadryl, IV Decadron, IV fluids.   On reassessment, patient headache symptoms have all completely resolved and patient blood pressure has improved significantly from original arrival and patient is declining labetalol dosing to nursing staff which I feel is generally appropriate given blood pressure 957T to 247T systolic over 80I to 93M. We discussed x-ray findings and minimally elevated BNP of unclear etiology, certainly uncontrolled hypertension could be contributing. Unclear whether or not patient elevated blood pressures over the last week or more related to headache symptoms or the other way around. I did recommend increasing amlodipine to 10 mg daily and to switch taking her medications for hypertension to the morning it is normal and she states she takes all of her blood pressure medications in the evening, I recommended she continue taking lisinopril in the evening. I recommended follow-up with patient cardiologist as well as primary care provider. I did indicate to patient that hemoglobin of 17 should be considered abnormal in a 25-year-old female and she should have further discussions with her primary care physician whether or not this warrants further evaluation with hematology or other outpatient means of further testing. I recommended close monitoring of symptoms and low threshold to return. Patient/family given instructions to return as needed for any questions, concerns or worsening symptoms, particularly those as outlined in the disposition section / discharge section of patient discharge paperwork. Patient/family verbalizes understanding agreement with ED course/plan in shared medical decision making. Questions answered. Complexity of Problems Addressed:  1 or more chronic illnesses with a severe exacerbation or progression. 1 or more acute illnesses that pose a threat to life or bodily function.      Data Reviewed and Analyzed:  Category 1:   I reviewed records from an external source: provider visit notes from outside specialist.  I reviewed records from an external source: Reviewed January 2023 transthoracic echocardiogram detailing EF of 70-75% with findings of hypertrophic cardiomyopathy  Reviewed cardiology visit from 03/07/2023 regarding blood pressure management    I ordered each unique test.  I reviewed the results of each unique test.      Category 2:     ED EKG was independently interpreted in the absence of a cardiologist.  Normal sinus rhythm, rate of 85, no evidence of ST elevation MI, there is LVH present with associated repolarization changes, QTc is 480 and is not prolonged, QRS is 90 and is not widened, EKG morphology largely unchanged from    Radiology:  My independent chest x-ray review shows no pneumothorax, no pneumonia, no aortic widening, no noted focal consolidation. Please see MDI above for radiology read/interpretation    Category 3: Discussion of management or test interpretation. See MDM / clinical course section above for details    Risk of Complications and/or Morbidity of Patient Management:  Prescription drug management performed, Drug therapy given requiring intensive monitoring for toxicity, and Patient was discharged risks and benefits of hospitalization were considered   Received concurrent IV QT prolonging medications requiring monitoring. Shared medical decision making performed with patient/family during course of ER visit and a determination of disposition whenever appropriate. Sepsis:  Is this patient to be included in the SEP-1 core measure due to severe sepsis or septic shock? No Exclusion criteria - the patient is NOT to be included for SEP-1 Core Measure due to:  Infection is not suspected     Orders Placed This Encounter   Procedures    CT HEAD WO CONTRAST    XR CHEST (2 VW)    CBC with Auto Differential    CMP    Troponin    Brain Natriuretic Peptide    EKG 12 Lead    Saline lock IV      ED Meds Given:  Medications   prochlorperazine (COMPAZINE) injection 7.5 mg (7.5 mg IntraVENous Given 3/9/23 1432)   diphenhydrAMINE (BENADRYL) injection 25 mg (25 mg IntraVENous Given 3/9/23 1427)   dexamethasone (DECADRON) injection 10 mg (10 mg IntraVENous Given 3/9/23 1429)   0.9 % sodium chloride bolus (0 mLs IntraVENous Stopped 3/9/23 1546)     Discharge Medication List as of 3/9/2023  4:43 PM            ___________________    HPI: Herb Muñoz is a 50 y.o. female with past medical history of CAD, previous CHF, hypertrophic cardiomyopathy, obesity, history of medication noncompliance presenting here for elevated blood pressure and other symptoms. Patient with what sounds like refractory hypertension symptoms, last visit in the ER February 15, 2023. Patient followed up with cardiology last week who changed patient blood pressure medications, removing carvedilol and clonidine, adding amlodipine 5 mg daily, patient initially started on metoprolol XL 50 mg daily, now currently taking 100 mg daily, also taking lisinopril 40 mg and 25 mg hydrochlorothiazide daily. Patient notes symptoms of dyspnea with exertion and sometimes exhaustion symptoms. Patient notes blood pressure remains fairly elevated, in triage here it is 165/118. Patient denies any focal neurologic deficits of speech, gait, sensory or motor function, no visual changes or symptoms. Patient does note headaches when she wakes up in the morning with mild nausea, headaches are described as bilateral temporal parietal moving into the frontal region, recent episode with increased pain in the right frontal forehead area after radiation from temporal parietal area. No projectile vomiting. No aggravation with chewing or head movement. No history of recent fall or trauma. Patient states she was in contact with her cardiology office regarding continued blood pressure and symptoms as mentioned, they recommended she present to the ER for further evaluation. Patient notes intermittent chest discomfort symptoms not necessarily related to exertion but patient notes her blood pressure is usually elevated when she is having chest discomfort symptoms. No palpitations, near-syncope or syncopal episodes.  Patient/family denies any other evaluation for today's acute complaint. Patient/family denies any other aggravating or alleviating factors. Patient/family denies any other symptoms. ROS:   All review of systems negative except as noted above in the history of the present illness.     Past Medical/ Family/ Social History:     Medical history:   Past Medical History:   Diagnosis Date    Allergic rhinitis     Seasonal    Anxiety 2015    CAD in native artery 2023    CHF (congestive heart failure) ([address] 2014    Chronic back pain     Depression 1991    I have struggled with depression on and off since then    Headache 2001    Heart failure ([address]     Hypertension     Hypertrophic cardiomyopathy ([facility] [address]     Obesity 1994    Had a hard time losing weight after my first child    Psychiatric disorder     anxiety and depression       Surgical history:   Past Surgical History:   Procedure Laterality Date     SECTION  1999. 2000    First was emergency and second was scheduled    CHOLECYSTECTOMY      PARTIAL HYSTERECTOMY (CERVIX NOT REMOVED)  2014    TUBAL LIGATION      c section times 2       Family history:   Family History   Problem Relation Age of Onset    Allergy (Severe) Mother     Asthma Mother     Coronary Art Dis Mother     Depression Mother     Heart Attack Mother     Heart Disease Mother     High Blood Pressure Mother     High Cholesterol Mother     Mental Illness Mother     Obesity Mother     Allergy (Severe) Father     Clotting Disorder Father     Diabetes Father     High Blood Pressure Father     High Cholesterol Father     Obesity Father     Stroke Father     Allergy (Severe) Brother     Asthma Brother     Heart Attack Brother     Heart Disease Brother     High Blood Pressure Brother     High Cholesterol Brother     Obesity Brother     Allergy (Severe) Brother     Cancer Brother     High Blood Pressure Brother     Obesity Brother     Asthma Maternal Grandmother     Coronary Art Dis Maternal Grandmother Heart Disease Maternal Grandmother     High Blood Pressure Maternal Grandmother     Obesity Maternal Grandmother     Heart Attack Maternal Grandfather     High Blood Pressure Maternal Grandfather     Cancer Paternal Uncle     Breast Cancer Neg Hx        Social history:   Social History     Socioeconomic History    Marital status: Single   Tobacco Use    Smoking status: Every Day     Packs/day: 1.00     Years: 31.00     Pack years: 31.00     Types: Cigarettes     Start date: 6/19/1991    Smokeless tobacco: Never   Vaping Use    Vaping Use: Never used   Substance and Sexual Activity    Alcohol use: Yes     Alcohol/week: 2.0 standard drinks     Types: 2 Glasses of wine per week    Drug use: Never    Sexual activity: Yes     Partners: Male     Birth control/protection: Surgical, None     Social Determinants of Health     Financial Resource Strain: Low Risk     Difficulty of Paying Living Expenses: Not hard at all   Food Insecurity: No Food Insecurity    Worried About Running Out of Food in the Last Year: Never true    Ran Out of Food in the Last Year: Never true   Transportation Needs: Unknown    Lack of Transportation (Non-Medical):  No   Physical Activity: Unknown    Days of Exercise per Week: 5 days    Minutes of Exercise per Session: Patient refused   Intimate Partner Violence: Unknown    Fear of Current or Ex-Partner: No    Emotionally Abused: Patient refused    Physically Abused: Patient refused    Sexually Abused: No   Housing Stability: Unknown    Unstable Housing in the Last Year: No        Medications:   Discharge Medication List as of 3/9/2023  4:43 PM        CONTINUE these medications which have NOT CHANGED    Details   ALPRAZolam (XANAX) 0.5 MG tablet PLEASE SEE ATTACHED FOR DETAILED DIRECTIONSHistorical Med      hydroCHLOROthiazide (HYDRODIURIL) 25 MG tablet Take 1 tablet by mouth every morning, Disp-30 tablet, R-0NO PRINT      sertraline (ZOLOFT) 50 MG tablet Take half a tablet once a day for 7 days, then one tablet daily, Disp-90 tablet, R-1Normal      metoprolol succinate (TOPROL XL) 100 MG extended release tablet Take 1 tablet by mouth daily, Disp-90 tablet, R-1Normal      amLODIPine (NORVASC) 5 MG tablet Take 1 tablet by mouth daily, Disp-90 tablet, R-3Normal      atorvastatin (LIPITOR) 40 MG tablet Take 1 tablet by mouth every evening, Disp-90 tablet, R-0NO PRINT      lisinopril (PRINIVIL;ZESTRIL) 40 MG tablet Take 1 tablet by mouth daily, Disp-90 tablet, R-0Normal      aspirin 81 MG EC tablet Take 81 mg by mouth dailyHistorical Med              Allergies: Allergies   Allergen Reactions    Latex Hives     condom    Doxycycline Hives    Meperidine Hives    Other Swelling     ceflexin    Sulfa Antibiotics Hives    Cephalexin Rash     Rash    Ketorolac Rash    Metoclopramide Anxiety    Nsaids Anxiety       Physical Exam   Vitals signs reviewed.    Patient Vitals for the past 4 hrs:   Pulse Resp BP SpO2   03/09/23 1615 -- -- (!) 157/77 93 %   03/09/23 1604 -- -- (!) 147/83 96 %   03/09/23 1518 80 20 (!) 173/82 98 %       General: Alert and oriented ×4, no acute distress   Eyes: Anicteric, conjunctiva pink, PERRLA, EOMI  ENT: No nasal discharge, no gross nasal congestion present  Pulmonary: Clear to auscultation bilaterally with symmetric chest rise, no increased work of breathing, no accessory muscle use  Cardiovascular: Regular rate and rhythm, no rub or gallop appreciated on my exam  GI: Abdomen is soft, nontender, nondistended  Musculoskeletal: No obvious joint deformity or joint effusion, normal joint range of motion  Neuro: Cranial nerves II through VII grossly intact, strength and sensation is grossly intact in the upper and lower extremities bilaterally  Skin: Skin is warm and dry    Procedures    Results for orders placed or performed during the hospital encounter of 03/09/23   CT HEAD WO CONTRAST    Narrative    EXAMINATION: CT HEAD WO CONTRAST 3/9/2023 2:14 PM    ACCESSION NUMBER: [id number]    COMPARISON: None available    INDICATION: Recurrent headaches, HTN    TECHNIQUE: Multiple-row detector helical CT examination of the head without  intravenous contrast.     Radiation dose reduction techniques were used for this study. Our CT scanners  use one or all of the following: Automated exposure control, adjustment of the  mA and/or kV according to patient size, iterative reconstruction. FINDINGS:    No acute intracranial hemorrhage, acute large territory infarct, mass effect, or  midline shift. The gray-white differentiation is preserved. No hydrocephalus. There are no extra-axial collections. The basal subarachnoid cisterns are  symmetric. The imaged paranasal sinuses are well-aerated. No mastoid effusion. The osseous structures and scalp soft tissues are unremarkable. Impression    1. No acute intracranial process. XR CHEST (2 VW)    Narrative    EXAMINATION: XR CHEST (2 VW) 3/9/2023 2:18 PM    ACCESSION NUMBER: [id number]    COMPARISON: Chest radiograph of February 15, 2023    INDICATION: Dyspnea    TECHNIQUE: PA and lateral views of the chest were obtained. FINDINGS:   Slightly prominent in size. Perihilar fullness and mild interstitial prominence  otherwise clear bilaterally. No pleural effusion or pneumothorax. Osseous  structures are unremarkable. Impression    1. Slight cardiac fullness and suggestion of mild vascular congestion/fluid  overload otherwise no acute process.      CBC with Auto Differential   Result Value Ref Range    WBC 8.1 4.3 - 11.1 K/uL    RBC 5.95 (H) 4.05 - 5.2 M/uL    Hemoglobin 17.2 (H) 11.7 - 15.4 g/dL    Hematocrit 53.7 (H) 35.8 - 46.3 %    MCV 90.3 82 - 102 FL    MCH 28.9 26.1 - 32.9 PG    MCHC 32.0 31.4 - 35.0 g/dL    RDW 15.7 (H) 11.9 - 14.6 %    Platelets 397 226 - 679 K/uL    MPV 10.5 9.4 - 12.3 FL    nRBC 0.00 0.0 - 0.2 K/uL    Differential Type AUTOMATED      Seg Neutrophils 62 43 - 78 %    Lymphocytes 28 13 - 44 % Monocytes 7 4.0 - 12.0 %    Eosinophils % 2 0.5 - 7.8 %    Basophils 1 0.0 - 2.0 %    Immature Granulocytes 0 0.0 - 5.0 %    Segs Absolute 5.1 1.7 - 8.2 K/UL    Absolute Lymph # 2.2 0.5 - 4.6 K/UL    Absolute Mono # 0.6 0.1 - 1.3 K/UL    Absolute Eos # 0.1 0.0 - 0.8 K/UL    Basophils Absolute 0.1 0.0 - 0.2 K/UL    Absolute Immature Granulocyte 0.0 0.0 - 0.5 K/UL   CMP   Result Value Ref Range    Sodium 140 133 - 143 mmol/L    Potassium 4.0 3.5 - 5.1 mmol/L    Chloride 109 101 - 110 mmol/L    CO2 30 21 - 32 mmol/L    Anion Gap 1 (L) 2 - 11 mmol/L    Glucose 88 65 - 100 mg/dL    BUN 20 6 - 23 MG/DL    Creatinine 0.90 0.6 - 1.0 MG/DL    Est, Glom Filt Rate >60 >60 ml/min/1.73m2    Calcium 9.0 8.3 - 10.4 MG/DL    Total Bilirubin 0.6 0.2 - 1.1 MG/DL    ALT 76 (H) 12 - 65 U/L    AST 47 (H) 15 - 37 U/L    Alk Phosphatase 159 (H) 50 - 136 U/L    Total Protein 8.0 6.3 - 8.2 g/dL    Albumin 3.7 3.5 - 5.0 g/dL    Globulin 4.3 2.8 - 4.5 g/dL    Albumin/Globulin Ratio 0.9 0.4 - 1.6     Troponin   Result Value Ref Range    Troponin, High Sensitivity 23.5 (H) 0 - 14 pg/mL   Brain Natriuretic Peptide   Result Value Ref Range    NT Pro-BNP 2,438 (H) 5 - 125 PG/ML   EKG 12 Lead   Result Value Ref Range    Ventricular Rate 85 BPM    Atrial Rate 85 BPM    P-R Interval 120 ms    QRS Duration 90 ms    Q-T Interval 404 ms    QTc Calculation (Bazett) 480 ms    P Axis 42 degrees    R Axis 20 degrees    T Axis 147 degrees    Diagnosis Normal sinus rhythm with sinus arrhythmia       XR CHEST (2 VW)   Final Result   1. Slight cardiac fullness and suggestion of mild vascular congestion/fluid   overload otherwise no acute process. CT HEAD WO CONTRAST   Final Result   1. No acute intracranial process. Voice dictation software was used during the making of this note. This software is not perfect and grammatical and other typographical errors may be present.   This note has not been completely proofread for errors.      Diane Jang MD  03/09/23 1910

## 2023-03-14 ENCOUNTER — OFFICE VISIT (OUTPATIENT)
Dept: INTERNAL MEDICINE CLINIC | Facility: CLINIC | Age: 48
End: 2023-03-14
Payer: COMMERCIAL

## 2023-03-14 VITALS
HEIGHT: 59 IN | BODY MASS INDEX: 44.96 KG/M2 | WEIGHT: 223 LBS | OXYGEN SATURATION: 97 % | HEART RATE: 68 BPM | DIASTOLIC BLOOD PRESSURE: 78 MMHG | SYSTOLIC BLOOD PRESSURE: 142 MMHG

## 2023-03-14 DIAGNOSIS — D75.1 ERYTHROCYTOSIS: ICD-10-CM

## 2023-03-14 DIAGNOSIS — R23.2 HOT FLASHES: ICD-10-CM

## 2023-03-14 DIAGNOSIS — I10 ESSENTIAL HYPERTENSION: Primary | ICD-10-CM

## 2023-03-14 DIAGNOSIS — F41.9 ANXIETY: ICD-10-CM

## 2023-03-14 PROCEDURE — 3077F SYST BP >= 140 MM HG: CPT | Performed by: STUDENT IN AN ORGANIZED HEALTH CARE EDUCATION/TRAINING PROGRAM

## 2023-03-14 PROCEDURE — 99214 OFFICE O/P EST MOD 30 MIN: CPT | Performed by: STUDENT IN AN ORGANIZED HEALTH CARE EDUCATION/TRAINING PROGRAM

## 2023-03-14 PROCEDURE — 3078F DIAST BP <80 MM HG: CPT | Performed by: STUDENT IN AN ORGANIZED HEALTH CARE EDUCATION/TRAINING PROGRAM

## 2023-03-14 RX ORDER — ALPRAZOLAM 0.5 MG/1
0.5 TABLET ORAL DAILY PRN
Qty: 3 TABLET | Refills: 0 | Status: SHIPPED | OUTPATIENT
Start: 2023-03-14 | End: 2023-04-13

## 2023-03-14 RX ORDER — AMLODIPINE BESYLATE 10 MG/1
10 TABLET ORAL DAILY
Qty: 90 TABLET | Refills: 1 | Status: SHIPPED | OUTPATIENT
Start: 2023-03-14

## 2023-03-14 RX ORDER — GABAPENTIN 100 MG/1
CAPSULE ORAL
Qty: 30 CAPSULE | Refills: 0 | Status: SHIPPED | OUTPATIENT
Start: 2023-03-14 | End: 2023-04-14

## 2023-03-14 RX ORDER — ALPRAZOLAM 0.5 MG/1
TABLET ORAL
Qty: 1 TABLET | Refills: 0 | Status: CANCELLED | OUTPATIENT
Start: 2023-03-21 | End: 2023-03-21

## 2023-03-14 ASSESSMENT — PATIENT HEALTH QUESTIONNAIRE - PHQ9
1. LITTLE INTEREST OR PLEASURE IN DOING THINGS: 0
2. FEELING DOWN, DEPRESSED OR HOPELESS: 0
SUM OF ALL RESPONSES TO PHQ QUESTIONS 1-9: 0
SUM OF ALL RESPONSES TO PHQ9 QUESTIONS 1 & 2: 0
SUM OF ALL RESPONSES TO PHQ QUESTIONS 1-9: 0

## 2023-03-14 ASSESSMENT — ENCOUNTER SYMPTOMS
SHORTNESS OF BREATH: 0
VOMITING: 0
ABDOMINAL PAIN: 0
NAUSEA: 0

## 2023-03-14 NOTE — PROGRESS NOTES
FOLLOW UP VISIT    Subjective:    Dustin Dugan (: 1975) is a 46 y.o., female,   Chief Complaint   Patient presents with    Hypertension    Headache    Anxiety       HPI:    She was seen in the ED 3/9/2023 for headache, hypertension. Troponins were flat, BNP elevated, hemoglobin 17.2 headaches are better. Depression, anxiety better, no SI. She accidentally took the Xanax, which she needs for her MRI. HTN, hypertrophic cardiomyopathy: BP at home better, up to 040F systolic. In the AM takes metoprolol, amlodipine, aspirin, HCTZ. Takes lisinopril in the evening. Denies chest pain, shortness of breath. Getting cardiac MRI soon. Doesn't snore at night, feels well rested in the AM, however has difficult to control high blood pressure, erythrocytosis, obesity with STOP BANG score of 3. Tested for sleep apnea before in . Endorses bothersome hot flashes and would like to try something for this.     The following portions of the patient's history were reviewed and updated as appropriate:      Patient Active Problem List   Diagnosis    Chronic dyspnea    CAD in native artery    Hypertension    Anxiety    Tobacco dependence    Chronic venous insufficiency     Past Surgical History:   Procedure Laterality Date     SECTION  1999. 2000    First was emergency and second was scheduled    CHOLECYSTECTOMY      PARTIAL HYSTERECTOMY (CERVIX NOT REMOVED)  2014    TUBAL LIGATION      c section times 2     Family History   Problem Relation Age of Onset    Allergy (Severe) Mother     Asthma Mother     Coronary Art Dis Mother     Depression Mother     Heart Attack Mother     Heart Disease Mother     High Blood Pressure Mother     High Cholesterol Mother     Mental Illness Mother     Obesity Mother     Allergy (Severe) Father     Clotting Disorder Father     Diabetes Father     High Blood Pressure Father     High Cholesterol Father     Obesity Father     Stroke Father     Allergy (Severe) Brother Asthma Brother     Heart Attack Brother     Heart Disease Brother     High Blood Pressure Brother     High Cholesterol Brother     Obesity Brother     Allergy (Severe) Brother     Cancer Brother     High Blood Pressure Brother     Obesity Brother     Asthma Maternal Grandmother     Coronary Art Dis Maternal Grandmother     Heart Disease Maternal Grandmother     High Blood Pressure Maternal Grandmother     Obesity Maternal Grandmother     Heart Attack Maternal Grandfather     High Blood Pressure Maternal Grandfather     Cancer Paternal Uncle     Breast Cancer Neg Hx      Social History     Socioeconomic History    Marital status: Single     Spouse name: Not on file    Number of children: Not on file    Years of education: Not on file    Highest education level: Not on file   Occupational History    Not on file   Tobacco Use    Smoking status: Every Day     Packs/day: 1.00     Years: 31.00     Pack years: 31.00     Types: Cigarettes     Start date: 6/19/1991    Smokeless tobacco: Never   Vaping Use    Vaping Use: Never used   Substance and Sexual Activity    Alcohol use: Yes     Alcohol/week: 2.0 standard drinks     Types: 2 Glasses of wine per week    Drug use: Never    Sexual activity: Yes     Partners: Male     Birth control/protection: Surgical, None   Other Topics Concern    Not on file   Social History Narrative    Not on file     Social Determinants of Health     Financial Resource Strain: Low Risk     Difficulty of Paying Living Expenses: Not hard at all   Food Insecurity: No Food Insecurity    Worried About [address] in the Last Year: Never true    [facility] in the Last Year: Never true   Transportation Needs: Unknown    Lack of Transportation (Medical): Not on file    Lack of Transportation (Non-Medical):  No   Physical Activity: Unknown    Days of Exercise per Week: 5 days    Minutes of Exercise per Session: Patient refused   Stress: Not on file   Social Connections: Not on file   Intimate Partner Violence: Unknown    Fear of Current or Ex-Partner: No    Emotionally Abused: Patient refused    Physically Abused: Patient refused    Sexually Abused: No   Housing Stability: Unknown    Unable to Pay for Housing in the Last Year: Not on file    Number of Jackie in the Last Year: Not on file    Unstable Housing in the Last Year: No     Current Outpatient Medications   Medication Sig Dispense Refill    amLODIPine (NORVASC) 10 MG tablet Take 1 tablet by mouth daily 90 tablet 1    ALPRAZolam (XANAX) 0.5 MG tablet Take 1 tablet by mouth daily as needed for Anxiety for up to 30 days. Max Daily Amount: 0.5 mg 3 tablet 0    gabapentin (NEURONTIN) 100 MG capsule Daily as needed for hot flashes 30 capsule 0    hydroCHLOROthiazide (HYDRODIURIL) 25 MG tablet Take 1 tablet by mouth every morning 30 tablet 0    sertraline (ZOLOFT) 50 MG tablet Take half a tablet once a day for 7 days, then one tablet daily (Patient taking differently: Take 50 mg by mouth daily 50 mg daily) 90 tablet 1    metoprolol succinate (TOPROL XL) 100 MG extended release tablet Take 1 tablet by mouth daily 90 tablet 1    atorvastatin (LIPITOR) 40 MG tablet Take 1 tablet by mouth every evening 90 tablet 0    lisinopril (PRINIVIL;ZESTRIL) 40 MG tablet Take 1 tablet by mouth daily 90 tablet 0    aspirin 81 MG EC tablet Take 81 mg by mouth daily       No current facility-administered medications for this visit. Allergies as of 03/14/2023 - Fully Reviewed 03/14/2023   Allergen Reaction Noted    Latex Hives 07/16/2017    Doxycycline Hives 07/15/2017    Meperidine Hives 10/02/2008    Other Swelling 02/01/2009    Sulfa antibiotics Hives 10/02/2008    Cephalexin Rash 01/19/2022    Ketorolac Rash 07/15/2017    Metoclopramide Anxiety 07/15/2017    Nsaids Anxiety 09/02/2017       Review of Systems   Constitutional:  Negative for chills and fever. Respiratory:  Negative for shortness of breath. Cardiovascular:  Negative for chest pain. Gastrointestinal:  Negative for abdominal pain, nausea and vomiting. Objective:    Vitals:    03/14/23 1427   BP: (!) 142/78   Site: Left Upper Arm   Position: Sitting   Cuff Size: Large Adult   Pulse: 68   SpO2: 97%   Weight: 223 lb (101.2 kg)   Height: 4' 11\" (1.499 m)        Physical Exam  Constitutional:       General: She is not in acute distress. Appearance: Normal appearance. HENT:      Head: Normocephalic and atraumatic. Eyes:      Extraocular Movements: Extraocular movements intact. Conjunctiva/sclera: Conjunctivae normal.   Cardiovascular:      Rate and Rhythm: Normal rate and regular rhythm. Heart sounds: No murmur heard. Pulmonary:      Effort: Pulmonary effort is normal.      Breath sounds: Normal breath sounds. No wheezing, rhonchi or rales. Skin:     General: Skin is warm and dry. Neurological:      Mental Status: She is alert. Mental status is at baseline.    Psychiatric:         Mood and Affect: Mood normal.         Behavior: Behavior normal.       Lab Results   Component Value Date    WBC 8.1 03/09/2023    HGB 17.2 (H) 03/09/2023    HCT 53.7 (H) 03/09/2023    MCV 90.3 03/09/2023     03/09/2023      Lab Results   Component Value Date/Time     03/09/2023 02:07 PM    K 4.0 03/09/2023 02:07 PM     03/09/2023 02:07 PM    CO2 30 03/09/2023 02:07 PM    BUN 20 03/09/2023 02:07 PM    CREATININE 0.90 03/09/2023 02:07 PM    GLUCOSE 88 03/09/2023 02:07 PM    CALCIUM 9.0 03/09/2023 02:07 PM       Lab Results   Component Value Date     03/09/2023    K 4.0 03/09/2023     03/09/2023    CO2 30 03/09/2023    BUN 20 03/09/2023    CREATININE 0.90 03/09/2023    GLUCOSE 88 03/09/2023    CALCIUM 9.0 03/09/2023    PROT 8.0 03/09/2023    LABALBU 3.7 03/09/2023    BILITOT 0.6 03/09/2023    ALKPHOS 159 (H) 03/09/2023    AST 47 (H) 03/09/2023    ALT 76 (H) 03/09/2023    LABGLOM >60 03/09/2023    GFRAA >60 08/23/2022    AGRATIO 0.9 (L) 01/24/2022    GLOB 4.3 03/09/2023 No results found for: TSHFT4, TSH, TSHREFLEX, NQY1JFS   Lab Results   Component Value Date    CHOL 130 01/24/2023     Lab Results   Component Value Date    TRIG 140 01/24/2023     Lab Results   Component Value Date    HDL 45 01/24/2023     Lab Results   Component Value Date    LDLCALC 57 01/24/2023     Lab Results   Component Value Date    LABVLDL 28 (H) 01/24/2023     Lab Results   Component Value Date    CHOLHDLRATIO 2.9 01/24/2023      Hemoglobin A1C   Date Value Ref Range Status   01/24/2023 5.9 (H) 4.8 - 5.6 % Final        Assessent & Plan    1. Essential hypertension  2. Anxiety  -     ALPRAZolam (XANAX) 0.5 MG tablet; Take 1 tablet by mouth daily as needed for Anxiety for up to 30 days. Max Daily Amount: 0.5 mg, Disp-3 tablet, R-0Normal  3. Hot flashes  4. Erythrocytosis     Blood pressure better, continue to monitor at home. Anxiety, depression are better, Continue current dose of Zoloft. Trial of gabapentin for hot flashes. Refer for sleep study    The patient and/or patient representative voiced understanding and agreement with the current diagnoses, recommendations, and possible side effects. Return in about 2 months (around 5/14/2023) for routine follow up.      Mariana Haynes MD

## 2023-03-20 ENCOUNTER — PATIENT MESSAGE (OUTPATIENT)
Dept: INTERNAL MEDICINE CLINIC | Facility: CLINIC | Age: 48
End: 2023-03-20

## 2023-03-20 RX ORDER — GABAPENTIN 100 MG/1
CAPSULE ORAL
Qty: 30 CAPSULE | Refills: 0 | Status: SHIPPED | OUTPATIENT
Start: 2023-03-20 | End: 2023-04-20

## 2023-03-20 NOTE — TELEPHONE ENCOUNTER
From: Don Greenfield  To: Dr. Po Tejada: 3/20/2023 10:38 AM EDT  Subject: Gabapentin    The pharmacy is having a hard time filling this prescription as they have indicated there were not proper instruction on how to take. Could you please reach out to the pharmacy and clarify? The hot flashes were so intense last night that I could not sleep.

## 2023-03-21 ENCOUNTER — HOSPITAL ENCOUNTER (OUTPATIENT)
Dept: MRI IMAGING | Age: 48
Discharge: HOME | End: 2023-03-24
Payer: COMMERCIAL

## 2023-03-21 DIAGNOSIS — I42.2 HYPERTROPHIC CARDIOMYOPATHY (HCC): ICD-10-CM

## 2023-03-21 PROCEDURE — A9579 GAD-BASE MR CONTRAST NOS,1ML: HCPCS | Performed by: INTERNAL MEDICINE

## 2023-03-21 PROCEDURE — 75561 CARDIAC MRI FOR MORPH W/DYE: CPT

## 2023-03-21 PROCEDURE — 6360000004 HC RX CONTRAST MEDICATION: Performed by: INTERNAL MEDICINE

## 2023-03-21 RX ADMIN — GADOTERIDOL 46 ML: 279.3 INJECTION, SOLUTION INTRAVENOUS at 10:44

## 2023-03-23 LAB
LV EF: 69 %
LVEF MODALITY: NORMAL

## 2023-03-24 ENCOUNTER — TELEPHONE (OUTPATIENT)
Dept: CARDIOLOGY CLINIC | Age: 48
End: 2023-03-24

## 2023-03-24 NOTE — TELEPHONE ENCOUNTER
----- Message from Donovan Thompson MD sent at 3/23/2023  7:35 PM EDT -----  Please let the patient know that her cardiac MRI demonstrated normal left and right function. However, her LV wall is severely thickened with a diameter of 2.4 cm. This is suggestive of hypertrophic cardiomyopathy. She has close follow-up to further discuss.

## 2023-03-24 NOTE — TELEPHONE ENCOUNTER
Advised patient of MRI results and Dr. Bruce Nab response. Advised patient to keep 4/24/23 appointment with Dr. Merlinda Alberta. Patient verbalized understanding.

## 2023-03-27 RX ORDER — HYDROCHLOROTHIAZIDE 25 MG/1
25 TABLET ORAL EVERY MORNING
Qty: 90 TABLET | Refills: 1 | Status: SHIPPED | OUTPATIENT
Start: 2023-03-27

## 2023-04-05 ENCOUNTER — TELEPHONE (OUTPATIENT)
Dept: CARDIOLOGY CLINIC | Age: 48
End: 2023-04-05

## 2023-04-05 ENCOUNTER — PATIENT MESSAGE (OUTPATIENT)
Dept: CARDIOLOGY CLINIC | Age: 48
End: 2023-04-05

## 2023-04-05 RX ORDER — AMLODIPINE BESYLATE 10 MG/1
5 TABLET ORAL DAILY
Qty: 45 TABLET | Refills: 5
Start: 2023-04-05

## 2023-04-05 NOTE — TELEPHONE ENCOUNTER
Advised patient of Dr. Paula Neumann response. Advised patient that BP may take about 5-7 days to stabilize after she decreases amlodipine dose. Patient verbalized understanding.    Requested Prescriptions     Signed Prescriptions Disp Refills    amLODIPine (NORVASC) 10 MG tablet 45 tablet 5     Sig: Take 0.5 tablets by mouth daily     Authorizing Provider: Kimberly Ramos     Ordering User: Marc Hawkins

## 2023-04-05 NOTE — TELEPHONE ENCOUNTER
Domenic Severin, MD Eleanor Muslim, RN  Caller: Unspecified (Today, 11:31 AM)  I recommend she decrease amlodipine to 5 mg daily.   Please reflect the change in the charted medication list. Post-Op Assessment Note    CV Status:  Stable  Pain Score: 0    Pain management: adequate     Mental Status:  Alert and awake   Hydration Status:  Euvolemic   PONV Controlled:  Controlled   Airway Patency:  Patent      Post Op Vitals Reviewed: Yes      Staff: CRNA         No complications documented      BP (P) 158/91 (01/14/21 1551)    Temp (P) 98 6 °F (37 °C) (01/14/21 1551)    Pulse (P) 94 (01/14/21 1551)   Resp (P) 18 (01/14/21 1551)    SpO2 (P) 99 % (01/14/21 1551)

## 2023-04-05 NOTE — TELEPHONE ENCOUNTER
Woke up, yesterday, with very bad dizziness, light headedness, faint feeling and fatigue with much lower BP-106/52, 96/53. Today, still very dizzy, light headed, faint feeling, and fatigued with BP-114/59, 106/58, 101/58. Stayed in bed and slept, most of yesterday and today. Feels \"wiped out\". Slight SOB on exertion. Before yesterday, BP-146/78. BP has never been this low in the past.  Has not checked HR. Afebrile. No chest pain or palpitations. Drinking increased fluids. Taking HCTZ 25 mg qd, amlodipine 10 mg qd, Toprol  mg qd, lisinopril 40  mg qd, ASA 81 mg qd, and  Lipitor 40 mg qd. Currently scheduled to see Dr. Micha Pringle on 4/24/23. Patient asks for Dr. Joan Degroot recommendations for symptomatic low BP.

## 2023-04-05 NOTE — TELEPHONE ENCOUNTER
----- Message from Jacob Carmona sent at 4/5/2023 11:09 AM EDT -----  Regarding: FW: Blood pressure   Contact: [phone]    ----- Message -----  From: So Kimball  Sent: 4/5/2023  10:35 AM EDT  To: [facility] Clinical Staff  Subject: Blood pressure                                   The past two days I have been experiencing lower blood pressures that my body is not used to. In addition, I have been feeling very fatigue and faint. My latest reading was 106/58.  I contacted my PCP and she told me to contact my cardiologist immediately

## 2023-04-20 RX ORDER — GABAPENTIN 100 MG/1
CAPSULE ORAL
Qty: 90 CAPSULE | Refills: 0 | Status: SHIPPED | OUTPATIENT
Start: 2023-04-20 | End: 2023-05-20

## 2023-04-24 ENCOUNTER — OFFICE VISIT (OUTPATIENT)
Dept: CARDIOLOGY CLINIC | Age: 48
End: 2023-04-24
Payer: COMMERCIAL

## 2023-04-24 VITALS
BODY MASS INDEX: 46.16 KG/M2 | HEIGHT: 59 IN | DIASTOLIC BLOOD PRESSURE: 74 MMHG | SYSTOLIC BLOOD PRESSURE: 122 MMHG | WEIGHT: 229 LBS | HEART RATE: 100 BPM

## 2023-04-24 DIAGNOSIS — R07.89 ATYPICAL CHEST PAIN: Primary | ICD-10-CM

## 2023-04-24 DIAGNOSIS — R06.09 CHRONIC DYSPNEA: ICD-10-CM

## 2023-04-24 DIAGNOSIS — I10 HYPERTENSION, UNSPECIFIED TYPE: ICD-10-CM

## 2023-04-24 DIAGNOSIS — I25.10 CAD IN NATIVE ARTERY: ICD-10-CM

## 2023-04-24 DIAGNOSIS — I42.2 HYPERTROPHIC CARDIOMYOPATHY (HCC): ICD-10-CM

## 2023-04-24 PROBLEM — I1A.0 RESISTANT HYPERTENSION: Status: ACTIVE | Noted: 2023-01-05

## 2023-04-24 PROCEDURE — 99214 OFFICE O/P EST MOD 30 MIN: CPT | Performed by: INTERNAL MEDICINE

## 2023-04-24 PROCEDURE — 3074F SYST BP LT 130 MM HG: CPT | Performed by: INTERNAL MEDICINE

## 2023-04-24 PROCEDURE — 3078F DIAST BP <80 MM HG: CPT | Performed by: INTERNAL MEDICINE

## 2023-04-24 RX ORDER — AMLODIPINE BESYLATE 2.5 MG/1
2.5 TABLET ORAL DAILY
Qty: 90 TABLET | Refills: 3 | Status: SHIPPED | OUTPATIENT
Start: 2023-04-24

## 2023-04-24 RX ORDER — METOPROLOL SUCCINATE 200 MG/1
200 TABLET, EXTENDED RELEASE ORAL DAILY
Qty: 90 TABLET | Refills: 3 | Status: SHIPPED | OUTPATIENT
Start: 2023-04-24

## 2023-05-08 ENCOUNTER — HOSPITAL ENCOUNTER (OUTPATIENT)
Dept: SLEEP CENTER | Age: 48
Discharge: HOME | End: 2023-05-11
Payer: COMMERCIAL

## 2023-05-08 PROCEDURE — 95806 SLEEP STUDY UNATT&RESP EFFT: CPT

## 2023-05-09 RX ORDER — LISINOPRIL 40 MG/1
40 TABLET ORAL DAILY
Qty: 90 TABLET | Refills: 1 | Status: SHIPPED | OUTPATIENT
Start: 2023-05-09

## 2023-05-16 ENCOUNTER — OFFICE VISIT (OUTPATIENT)
Dept: INTERNAL MEDICINE CLINIC | Facility: CLINIC | Age: 48
End: 2023-05-16
Payer: COMMERCIAL

## 2023-05-16 VITALS
DIASTOLIC BLOOD PRESSURE: 72 MMHG | HEIGHT: 59 IN | WEIGHT: 237 LBS | BODY MASS INDEX: 47.78 KG/M2 | SYSTOLIC BLOOD PRESSURE: 130 MMHG | HEART RATE: 56 BPM | OXYGEN SATURATION: 95 %

## 2023-05-16 DIAGNOSIS — G47.00 INSOMNIA, UNSPECIFIED TYPE: ICD-10-CM

## 2023-05-16 DIAGNOSIS — I10 ESSENTIAL HYPERTENSION: Primary | ICD-10-CM

## 2023-05-16 DIAGNOSIS — F41.8 DEPRESSION WITH ANXIETY: ICD-10-CM

## 2023-05-16 DIAGNOSIS — I42.2 HYPERTROPHIC CARDIOMYOPATHY (HCC): ICD-10-CM

## 2023-05-16 DIAGNOSIS — R74.8 ELEVATED LIVER ENZYMES: ICD-10-CM

## 2023-05-16 PROBLEM — E66.01 MORBID OBESITY DUE TO EXCESS CALORIES (HCC): Status: ACTIVE | Noted: 2017-07-16

## 2023-05-16 PROBLEM — E78.5 HYPERLIPIDEMIA: Status: ACTIVE | Noted: 2017-07-16

## 2023-05-16 PROBLEM — N12 PYELONEPHRITIS: Status: RESOLVED | Noted: 2023-01-05 | Resolved: 2023-05-16

## 2023-05-16 PROBLEM — N12 PYELONEPHRITIS: Status: ACTIVE | Noted: 2023-01-05

## 2023-05-16 PROBLEM — I25.10 ARTERIOSCLEROSIS OF CORONARY ARTERY: Status: ACTIVE | Noted: 2017-07-16

## 2023-05-16 PROCEDURE — 3075F SYST BP GE 130 - 139MM HG: CPT | Performed by: STUDENT IN AN ORGANIZED HEALTH CARE EDUCATION/TRAINING PROGRAM

## 2023-05-16 PROCEDURE — 3078F DIAST BP <80 MM HG: CPT | Performed by: STUDENT IN AN ORGANIZED HEALTH CARE EDUCATION/TRAINING PROGRAM

## 2023-05-16 PROCEDURE — 99214 OFFICE O/P EST MOD 30 MIN: CPT | Performed by: STUDENT IN AN ORGANIZED HEALTH CARE EDUCATION/TRAINING PROGRAM

## 2023-05-16 RX ORDER — TRAZODONE HYDROCHLORIDE 50 MG/1
50 TABLET ORAL NIGHTLY PRN
Qty: 30 TABLET | Refills: 5 | Status: SHIPPED | OUTPATIENT
Start: 2023-05-16

## 2023-05-16 ASSESSMENT — PATIENT HEALTH QUESTIONNAIRE - PHQ9
8. MOVING OR SPEAKING SO SLOWLY THAT OTHER PEOPLE COULD HAVE NOTICED. OR THE OPPOSITE, BEING SO FIGETY OR RESTLESS THAT YOU HAVE BEEN MOVING AROUND A LOT MORE THAN USUAL: 0
7. TROUBLE CONCENTRATING ON THINGS, SUCH AS READING THE NEWSPAPER OR WATCHING TELEVISION: 0
6. FEELING BAD ABOUT YOURSELF - OR THAT YOU ARE A FAILURE OR HAVE LET YOURSELF OR YOUR FAMILY DOWN: 0
3. TROUBLE FALLING OR STAYING ASLEEP: 3
SUM OF ALL RESPONSES TO PHQ9 QUESTIONS 1 & 2: 0
2. FEELING DOWN, DEPRESSED OR HOPELESS: 0
4. FEELING TIRED OR HAVING LITTLE ENERGY: 3
10. IF YOU CHECKED OFF ANY PROBLEMS, HOW DIFFICULT HAVE THESE PROBLEMS MADE IT FOR YOU TO DO YOUR WORK, TAKE CARE OF THINGS AT HOME, OR GET ALONG WITH OTHER PEOPLE: 0
1. LITTLE INTEREST OR PLEASURE IN DOING THINGS: 0
SUM OF ALL RESPONSES TO PHQ QUESTIONS 1-9: 9
5. POOR APPETITE OR OVEREATING: 3
9. THOUGHTS THAT YOU WOULD BE BETTER OFF DEAD, OR OF HURTING YOURSELF: 0
SUM OF ALL RESPONSES TO PHQ QUESTIONS 1-9: 9

## 2023-05-16 ASSESSMENT — ENCOUNTER SYMPTOMS: SHORTNESS OF BREATH: 1

## 2023-05-16 NOTE — PATIENT INSTRUCTIONS
·Have coffee, tea, and other foods that have caffeine only in the morning   ·Avoid alcohol in the late afternoon, evening, and bedtime   ·Get plenty of physical activity, but avoid heavy exercise right before bed   ·Avoid looking at phones, computer screens, or reading devices (\"e-books\") that give off light before bed. This can make it harder to fall asleep. ·Keep your bedroom dark, cool, quiet, and free of reminders of work or other things that cause you stress   ·Do not try to force yourself to sleep. If you can't sleep, get out of bed and try again later.    ·Sleep only long enough to feel rested and then get out of bed   ·Go to bed and get up at the same time every day

## 2023-05-16 NOTE — PROGRESS NOTES
FOLLOW UP VISIT    Subjective:    Mil Velasquez (: 1975) is a 46 y.o., female,   Chief Complaint   Patient presents with    Hypertension    Anxiety    Sleep Problem       HPI:    HTN: checks at home, usually 110-130/65-80. On metoprolol 200 mg daily, amlodipine 2.5 mg daily, lisinopril 40 mg daily, HCTZ 25 mg daily. Had sleep study done, waiting on results. Anxiety, depression: Zoloft helping. No SI/HI. Insomnia: Not sleeping much at night, 3-4 hours a night. Both falling and staying asleep, moreso falling asleep. Tried melatonin, OTC sleep aid. Worries make it difficult to fall asleep. Previously klonopin which helped, on this for about a year and then off, this helped. Dyspnea with small tasks like with making the bed, this has been ongoing for years. Initially denied CP but later reports does get this with intercourse sometimes. Hot flashes: gabapentin helps considerably, takes 3-4x per week. Tobacco use: continues to smoke, increased to 5 per day because stress from going back to work, previously down to 2 per day. Other medical history:   Hypertrophic cardiomyopathy, CAD with non-obstructive disease on [facility] 2018: Seeing cardiology Dr. Marcello Brandon, getting NST soon.     The following portions of the patient's history were reviewed and updated as appropriate:      Patient Active Problem List   Diagnosis    BARRETT (dyspnea on exertion)    Arteriosclerosis of coronary artery    Hypertension    Anxiety    Tobacco dependence    Chronic venous insufficiency    Atypical chest pain    Hypertrophic cardiomyopathy ([address]    Morbid obesity due to excess calories ([address]    Depression with anxiety    Hyperlipidemia    Elevated liver enzymes    Insomnia     Past Surgical History:   Procedure Laterality Date     SECTION  1999. 2000    First was emergency and second was scheduled    CHOLECYSTECTOMY      PARTIAL HYSTERECTOMY (CERVIX NOT REMOVED)  2014    TUBAL LIGATION      c section times 2

## 2023-05-18 ENCOUNTER — TELEPHONE (OUTPATIENT)
Dept: INTERNAL MEDICINE CLINIC | Facility: CLINIC | Age: 48
End: 2023-05-18

## 2023-05-23 NOTE — TELEPHONE ENCOUNTER
I spoke with pt and advised her about the requested lab. She will have it done. I gave her the lab hours.

## 2023-05-24 ENCOUNTER — TELEPHONE (OUTPATIENT)
Age: 48
End: 2023-05-24

## 2023-05-24 DIAGNOSIS — R74.8 ELEVATED LIVER ENZYMES: ICD-10-CM

## 2023-05-24 DIAGNOSIS — I10 ESSENTIAL HYPERTENSION: ICD-10-CM

## 2023-05-24 DIAGNOSIS — I42.2 HYPERTROPHIC CARDIOMYOPATHY (HCC): ICD-10-CM

## 2023-05-24 LAB
ALBUMIN SERPL-MCNC: 3.8 G/DL (ref 3.5–5)
ALBUMIN/GLOB SERPL: 1.1 (ref 0.4–1.6)
ALP SERPL-CCNC: 123 U/L (ref 50–136)
ALT SERPL-CCNC: 49 U/L (ref 12–65)
ANION GAP SERPL CALC-SCNC: 0 MMOL/L (ref 2–11)
AST SERPL-CCNC: 25 U/L (ref 15–37)
BILIRUB SERPL-MCNC: 0.4 MG/DL (ref 0.2–1.1)
BUN SERPL-MCNC: 13 MG/DL (ref 6–23)
CALCIUM SERPL-MCNC: 9.1 MG/DL (ref 8.3–10.4)
CHLORIDE SERPL-SCNC: 109 MMOL/L (ref 101–110)
CO2 SERPL-SCNC: 31 MMOL/L (ref 21–32)
CREAT SERPL-MCNC: 0.8 MG/DL (ref 0.6–1)
GLOBULIN SER CALC-MCNC: 3.6 G/DL (ref 2.8–4.5)
GLUCOSE SERPL-MCNC: 80 MG/DL (ref 65–100)
POTASSIUM SERPL-SCNC: 5 MMOL/L (ref 3.5–5.1)
PROT SERPL-MCNC: 7.4 G/DL (ref 6.3–8.2)
SODIUM SERPL-SCNC: 140 MMOL/L (ref 133–143)

## 2023-05-24 NOTE — TELEPHONE ENCOUNTER
----- Message from Kenneth Starks MD sent at 5/24/2023  1:39 PM EDT -----  Please let the patient know the stress test was negative for myocardial ischemia.

## 2023-05-25 ENCOUNTER — TELEPHONE (OUTPATIENT)
Age: 48
End: 2023-05-25
Payer: COMMERCIAL

## 2023-05-25 DIAGNOSIS — I25.10 ARTERIOSCLEROSIS OF CORONARY ARTERY: Primary | ICD-10-CM

## 2023-05-25 DIAGNOSIS — R06.09 DOE (DYSPNEA ON EXERTION): ICD-10-CM

## 2023-05-25 DIAGNOSIS — I42.2 HYPERTROPHIC CARDIOMYOPATHY (HCC): ICD-10-CM

## 2023-05-25 DIAGNOSIS — R07.89 ATYPICAL CHEST PAIN: ICD-10-CM

## 2023-05-25 PROCEDURE — 99080 SPECIAL REPORTS OR FORMS: CPT | Performed by: INTERNAL MEDICINE

## 2023-05-31 NOTE — TELEPHONE ENCOUNTER
Called m that I have a few questions before I can fill out paperwork. When was her first day out of work and has she returned?

## 2023-05-31 NOTE — TELEPHONE ENCOUNTER
Pt called back and stated that she has been out of work since 2/15/23. Her job will not allow her to come back to work without paperwork stating that she can work.

## 2023-06-02 NOTE — TELEPHONE ENCOUNTER
Pt came in the office regarding FMLA. Pt has been out of work since 2/15/2023 and cannot return until she has the paperwork filled out.

## 2023-07-05 RX ORDER — GABAPENTIN 100 MG/1
CAPSULE ORAL
Qty: 90 CAPSULE | Refills: 1 | Status: SHIPPED | OUTPATIENT
Start: 2023-07-05 | End: 2024-01-05

## 2023-07-14 ENCOUNTER — PATIENT MESSAGE (OUTPATIENT)
Dept: INTERNAL MEDICINE CLINIC | Facility: CLINIC | Age: 48
End: 2023-07-14

## 2023-07-14 DIAGNOSIS — R73.03 PREDIABETES: Primary | ICD-10-CM

## 2023-07-14 DIAGNOSIS — I10 ESSENTIAL HYPERTENSION: ICD-10-CM

## 2023-07-14 DIAGNOSIS — E78.5 HYPERLIPIDEMIA, UNSPECIFIED HYPERLIPIDEMIA TYPE: ICD-10-CM

## 2023-07-28 DIAGNOSIS — I10 ESSENTIAL HYPERTENSION: ICD-10-CM

## 2023-07-28 DIAGNOSIS — R73.03 PREDIABETES: ICD-10-CM

## 2023-07-28 LAB
ALBUMIN SERPL-MCNC: 3.7 G/DL (ref 3.5–5)
ALBUMIN/GLOB SERPL: 1.1 (ref 0.4–1.6)
ALP SERPL-CCNC: 122 U/L (ref 50–136)
ALT SERPL-CCNC: 48 U/L (ref 12–65)
ANION GAP SERPL CALC-SCNC: 8 MMOL/L (ref 2–11)
AST SERPL-CCNC: 25 U/L (ref 15–37)
BASOPHILS # BLD: 0 K/UL (ref 0–0.2)
BASOPHILS NFR BLD: 1 % (ref 0–2)
BILIRUB SERPL-MCNC: 0.3 MG/DL (ref 0.2–1.1)
BUN SERPL-MCNC: 11 MG/DL (ref 6–23)
CALCIUM SERPL-MCNC: 9 MG/DL (ref 8.3–10.4)
CHLORIDE SERPL-SCNC: 110 MMOL/L (ref 101–110)
CHOLEST SERPL-MCNC: 142 MG/DL
CO2 SERPL-SCNC: 25 MMOL/L (ref 21–32)
CREAT SERPL-MCNC: 0.9 MG/DL (ref 0.6–1)
DIFFERENTIAL METHOD BLD: ABNORMAL
EOSINOPHIL # BLD: 0.1 K/UL (ref 0–0.8)
EOSINOPHIL NFR BLD: 2 % (ref 0.5–7.8)
ERYTHROCYTE [DISTWIDTH] IN BLOOD BY AUTOMATED COUNT: 14.2 % (ref 11.9–14.6)
GLOBULIN SER CALC-MCNC: 3.5 G/DL (ref 2.8–4.5)
GLUCOSE SERPL-MCNC: 91 MG/DL (ref 65–100)
HCT VFR BLD AUTO: 48.5 % (ref 35.8–46.3)
HDLC SERPL-MCNC: 41 MG/DL (ref 40–60)
HDLC SERPL: 3.5
HGB BLD-MCNC: 15.7 G/DL (ref 11.7–15.4)
IMM GRANULOCYTES # BLD AUTO: 0 K/UL (ref 0–0.5)
IMM GRANULOCYTES NFR BLD AUTO: 0 % (ref 0–5)
LDLC SERPL CALC-MCNC: 76.2 MG/DL
LYMPHOCYTES # BLD: 1.6 K/UL (ref 0.5–4.6)
LYMPHOCYTES NFR BLD: 27 % (ref 13–44)
MCH RBC QN AUTO: 30.1 PG (ref 26.1–32.9)
MCHC RBC AUTO-ENTMCNC: 32.4 G/DL (ref 31.4–35)
MCV RBC AUTO: 92.9 FL (ref 82–102)
MONOCYTES # BLD: 0.4 K/UL (ref 0.1–1.3)
MONOCYTES NFR BLD: 7 % (ref 4–12)
NEUTS SEG # BLD: 3.8 K/UL (ref 1.7–8.2)
NEUTS SEG NFR BLD: 63 % (ref 43–78)
NRBC # BLD: 0 K/UL (ref 0–0.2)
PLATELET # BLD AUTO: 234 K/UL (ref 150–450)
PMV BLD AUTO: 11.5 FL (ref 9.4–12.3)
POTASSIUM SERPL-SCNC: 4.1 MMOL/L (ref 3.5–5.1)
PROT SERPL-MCNC: 7.2 G/DL (ref 6.3–8.2)
RBC # BLD AUTO: 5.22 M/UL (ref 4.05–5.2)
SODIUM SERPL-SCNC: 143 MMOL/L (ref 133–143)
TRIGL SERPL-MCNC: 124 MG/DL (ref 35–150)
TSH W FREE THYROID IF ABNORMAL: 1.51 UIU/ML (ref 0.36–3.74)
VLDLC SERPL CALC-MCNC: 24.8 MG/DL (ref 6–23)
WBC # BLD AUTO: 6 K/UL (ref 4.3–11.1)

## 2023-07-29 LAB
EST. AVERAGE GLUCOSE BLD GHB EST-MCNC: 126 MG/DL
HBA1C MFR BLD: 6 % (ref 4.8–5.6)

## 2023-07-29 ASSESSMENT — PATIENT HEALTH QUESTIONNAIRE - PHQ9
10. IF YOU CHECKED OFF ANY PROBLEMS, HOW DIFFICULT HAVE THESE PROBLEMS MADE IT FOR YOU TO DO YOUR WORK, TAKE CARE OF THINGS AT HOME, OR GET ALONG WITH OTHER PEOPLE: 1
6. FEELING BAD ABOUT YOURSELF - OR THAT YOU ARE A FAILURE OR HAVE LET YOURSELF OR YOUR FAMILY DOWN: SEVERAL DAYS
SUM OF ALL RESPONSES TO PHQ QUESTIONS 1-9: 10
SUM OF ALL RESPONSES TO PHQ QUESTIONS 1-9: 10
10. IF YOU CHECKED OFF ANY PROBLEMS, HOW DIFFICULT HAVE THESE PROBLEMS MADE IT FOR YOU TO DO YOUR WORK, TAKE CARE OF THINGS AT HOME, OR GET ALONG WITH OTHER PEOPLE: SOMEWHAT DIFFICULT
5. POOR APPETITE OR OVEREATING: 2
7. TROUBLE CONCENTRATING ON THINGS, SUCH AS READING THE NEWSPAPER OR WATCHING TELEVISION: MORE THAN HALF THE DAYS
2. FEELING DOWN, DEPRESSED OR HOPELESS: 1
SUM OF ALL RESPONSES TO PHQ QUESTIONS 1-9: 10
3. TROUBLE FALLING OR STAYING ASLEEP: 2
7. TROUBLE CONCENTRATING ON THINGS, SUCH AS READING THE NEWSPAPER OR WATCHING TELEVISION: 2
1. LITTLE INTEREST OR PLEASURE IN DOING THINGS: SEVERAL DAYS
1. LITTLE INTEREST OR PLEASURE IN DOING THINGS: 1
9. THOUGHTS THAT YOU WOULD BE BETTER OFF DEAD, OR OF HURTING YOURSELF: NOT AT ALL
SUM OF ALL RESPONSES TO PHQ QUESTIONS 1-9: 10
9. THOUGHTS THAT YOU WOULD BE BETTER OFF DEAD, OR OF HURTING YOURSELF: 0
8. MOVING OR SPEAKING SO SLOWLY THAT OTHER PEOPLE COULD HAVE NOTICED. OR THE OPPOSITE, BEING SO FIGETY OR RESTLESS THAT YOU HAVE BEEN MOVING AROUND A LOT MORE THAN USUAL: 0
8. MOVING OR SPEAKING SO SLOWLY THAT OTHER PEOPLE COULD HAVE NOTICED. OR THE OPPOSITE - BEING SO FIDGETY OR RESTLESS THAT YOU HAVE BEEN MOVING AROUND A LOT MORE THAN USUAL: NOT AT ALL
6. FEELING BAD ABOUT YOURSELF - OR THAT YOU ARE A FAILURE OR HAVE LET YOURSELF OR YOUR FAMILY DOWN: 1
3. TROUBLE FALLING OR STAYING ASLEEP: MORE THAN HALF THE DAYS
4. FEELING TIRED OR HAVING LITTLE ENERGY: SEVERAL DAYS
4. FEELING TIRED OR HAVING LITTLE ENERGY: 1
SUM OF ALL RESPONSES TO PHQ9 QUESTIONS 1 & 2: 2
2. FEELING DOWN, DEPRESSED OR HOPELESS: SEVERAL DAYS
SUM OF ALL RESPONSES TO PHQ QUESTIONS 1-9: 10
5. POOR APPETITE OR OVEREATING: MORE THAN HALF THE DAYS

## 2023-08-01 ENCOUNTER — OFFICE VISIT (OUTPATIENT)
Dept: INTERNAL MEDICINE CLINIC | Facility: CLINIC | Age: 48
End: 2023-08-01
Payer: COMMERCIAL

## 2023-08-01 VITALS
WEIGHT: 244 LBS | HEART RATE: 60 BPM | HEIGHT: 59 IN | BODY MASS INDEX: 49.19 KG/M2 | OXYGEN SATURATION: 96 % | SYSTOLIC BLOOD PRESSURE: 136 MMHG | DIASTOLIC BLOOD PRESSURE: 70 MMHG

## 2023-08-01 DIAGNOSIS — F17.210 CIGARETTE NICOTINE DEPENDENCE, UNCOMPLICATED: ICD-10-CM

## 2023-08-01 DIAGNOSIS — Z00.00 ANNUAL PHYSICAL EXAM: Primary | ICD-10-CM

## 2023-08-01 DIAGNOSIS — R63.2 BINGE EATING: ICD-10-CM

## 2023-08-01 DIAGNOSIS — Z12.11 SCREENING FOR MALIGNANT NEOPLASM OF COLON: ICD-10-CM

## 2023-08-01 DIAGNOSIS — Z87.891 PERSONAL HISTORY OF TOBACCO USE: ICD-10-CM

## 2023-08-01 DIAGNOSIS — R73.03 PREDIABETES: ICD-10-CM

## 2023-08-01 DIAGNOSIS — Z12.4 SCREENING FOR MALIGNANT NEOPLASM OF CERVIX: ICD-10-CM

## 2023-08-01 DIAGNOSIS — F41.9 ANXIETY: ICD-10-CM

## 2023-08-01 DIAGNOSIS — I10 ESSENTIAL HYPERTENSION: ICD-10-CM

## 2023-08-01 DIAGNOSIS — F33.1 MODERATE EPISODE OF RECURRENT MAJOR DEPRESSIVE DISORDER (HCC): ICD-10-CM

## 2023-08-01 PROCEDURE — 3075F SYST BP GE 130 - 139MM HG: CPT | Performed by: STUDENT IN AN ORGANIZED HEALTH CARE EDUCATION/TRAINING PROGRAM

## 2023-08-01 PROCEDURE — 99396 PREV VISIT EST AGE 40-64: CPT | Performed by: STUDENT IN AN ORGANIZED HEALTH CARE EDUCATION/TRAINING PROGRAM

## 2023-08-01 PROCEDURE — G0296 VISIT TO DETERM LDCT ELIG: HCPCS | Performed by: STUDENT IN AN ORGANIZED HEALTH CARE EDUCATION/TRAINING PROGRAM

## 2023-08-01 PROCEDURE — 3078F DIAST BP <80 MM HG: CPT | Performed by: STUDENT IN AN ORGANIZED HEALTH CARE EDUCATION/TRAINING PROGRAM

## 2023-08-01 RX ORDER — SERTRALINE HYDROCHLORIDE 100 MG/1
100 TABLET, FILM COATED ORAL DAILY
Qty: 90 TABLET | Refills: 2 | Status: SHIPPED | OUTPATIENT
Start: 2023-08-01

## 2023-08-01 RX ORDER — HYDROCHLOROTHIAZIDE 25 MG/1
25 TABLET ORAL EVERY MORNING
Qty: 90 TABLET | Refills: 3 | Status: SHIPPED | OUTPATIENT
Start: 2023-08-01

## 2023-08-01 ASSESSMENT — ENCOUNTER SYMPTOMS: SHORTNESS OF BREATH: 0

## 2023-08-01 NOTE — PROGRESS NOTES
FOLLOW UP VISIT    Subjective:    Ana Perez (: 1975) is a 46 y.o., female,   Chief Complaint   Patient presents with    Annual Exam     Had normal mammogram in Feb.    Hypertension    Anxiety       HPI:    Stress lately, looking for a job and has not been easy. Taking zoloft helped a lot at first but then situational issues made her depression worse. Doesn't want to get out of bed 3-4x per week. No SI/HI. Exercises, has pool at apartment complex, swims 30-45 mins daily. Insomnia: trazodone didn't help even 100 mg, sleep is all over the place. Tried melatonin. Practices sleep hygiene no electronics in the bedroom. Doesn't like [employer] [place] had amnesia. Previously klonopin which helped, on this for about a year and then off, this helped. Healthcare maintenance: had hysterectomy for endometrosis, not sure if cervix removed or not. Brother has history of melanoma, no suspicious moles, discussed sunscreen use. Declines flu shot, makes her sick. Hasn't had COVID shot, pneumonia (indicated for cigarette use) declines this today, shingles vaccines.  Last colonoscopy over 10 years ago, colonoscopy  for diverticulitis episode which was normal.    Other medical history:  Hypertrophic cardiomyopathy, CAD with non-obstructive disease on [facility] 2018: Seeing cardiology Dr. Pili Beebe,  HTN: BP controlled on HCTZ, amlodipine, metoprolol, lisinopril    The following portions of the patient's history were reviewed and updated as appropriate:      Patient Active Problem List   Diagnosis    BARRETT (dyspnea on exertion)    Arteriosclerosis of coronary artery    Hypertension    Anxiety    Tobacco dependence    Chronic venous insufficiency    Atypical chest pain    Hypertrophic cardiomyopathy ([address])    Morbid obesity due to excess calories ([address])    Depression with anxiety    Hyperlipidemia    Elevated liver enzymes    Insomnia    Prediabetes    Moderate episode of recurrent major depressive disorder ([address])     Past Surgical History:

## 2023-08-07 ENCOUNTER — TELEPHONE (OUTPATIENT)
Dept: INTERNAL MEDICINE CLINIC | Facility: CLINIC | Age: 48
End: 2023-08-07

## 2023-08-07 DIAGNOSIS — F33.1 MODERATE EPISODE OF RECURRENT MAJOR DEPRESSIVE DISORDER (HCC): ICD-10-CM

## 2023-08-07 DIAGNOSIS — F41.9 ANXIETY: ICD-10-CM

## 2023-08-07 RX ORDER — SERTRALINE HYDROCHLORIDE 100 MG/1
100 TABLET, FILM COATED ORAL DAILY
Qty: 90 TABLET | Refills: 2 | Status: SHIPPED | OUTPATIENT
Start: 2023-08-07

## 2023-08-07 NOTE — TELEPHONE ENCOUNTER
Pt has left a message that they need a new Rx to clarify the dosage pt is to take of Zoloft 100mg. Current rx reads one tablet daily and 50mg daily. Please review and send in a correct rx.

## 2023-09-14 ENCOUNTER — PATIENT MESSAGE (OUTPATIENT)
Dept: INTERNAL MEDICINE CLINIC | Facility: CLINIC | Age: 48
End: 2023-09-14

## 2023-09-14 DIAGNOSIS — G47.00 INSOMNIA, UNSPECIFIED TYPE: Primary | ICD-10-CM

## 2023-09-14 RX ORDER — CLONAZEPAM 0.5 MG/1
0.5 TABLET ORAL NIGHTLY PRN
Qty: 3 TABLET | Refills: 0 | Status: SHIPPED | OUTPATIENT
Start: 2023-09-14 | End: 2023-09-17

## 2023-10-02 ENCOUNTER — PATIENT MESSAGE (OUTPATIENT)
Dept: INTERNAL MEDICINE CLINIC | Facility: CLINIC | Age: 48
End: 2023-10-02

## 2023-10-02 ENCOUNTER — TELEMEDICINE (OUTPATIENT)
Dept: INTERNAL MEDICINE CLINIC | Facility: CLINIC | Age: 48
End: 2023-10-02
Payer: COMMERCIAL

## 2023-10-02 DIAGNOSIS — E78.5 HYPERLIPIDEMIA, UNSPECIFIED HYPERLIPIDEMIA TYPE: ICD-10-CM

## 2023-10-02 DIAGNOSIS — I25.10 ARTERIOSCLEROSIS OF CORONARY ARTERY: ICD-10-CM

## 2023-10-02 DIAGNOSIS — G47.00 INSOMNIA, UNSPECIFIED TYPE: Primary | ICD-10-CM

## 2023-10-02 DIAGNOSIS — I10 PRIMARY HYPERTENSION: ICD-10-CM

## 2023-10-02 DIAGNOSIS — I42.2 HYPERTROPHIC CARDIOMYOPATHY (HCC): ICD-10-CM

## 2023-10-02 DIAGNOSIS — G47.00 INSOMNIA, UNSPECIFIED TYPE: ICD-10-CM

## 2023-10-02 DIAGNOSIS — R73.03 PREDIABETES: ICD-10-CM

## 2023-10-02 DIAGNOSIS — F41.9 ANXIETY: ICD-10-CM

## 2023-10-02 DIAGNOSIS — F17.200 TOBACCO DEPENDENCE: ICD-10-CM

## 2023-10-02 PROCEDURE — 99423 OL DIG E/M SVC 21+ MIN: CPT | Performed by: STUDENT IN AN ORGANIZED HEALTH CARE EDUCATION/TRAINING PROGRAM

## 2023-10-02 RX ORDER — DOXEPIN HYDROCHLORIDE 3 MG/1
3 TABLET ORAL NIGHTLY PRN
Qty: 30 TABLET | Refills: 1 | Status: SHIPPED | OUTPATIENT
Start: 2023-10-02 | End: 2023-10-03

## 2023-10-02 ASSESSMENT — ENCOUNTER SYMPTOMS: SHORTNESS OF BREATH: 0

## 2023-10-02 NOTE — PROGRESS NOTES
Mitchell Nurse (:  1975) is seen via virtual visit today for evaluation of the following:   Chief Complaint   Patient presents with    Follow-up   . HPI:    Has new job in retail starting soon. Continues to have issues sleeping at night, 2-3 hours per night. Did home sleep study with [facility] 2023. Got 10 hours of sleep with clonazepam .5 mg tab. Has not tried doxepin before. Anxiety better on higher dose zoloft    Smoking less. HTN: BP has been good. per patient she got in touch with former gynecologist and has no cervix after hysterectomy, doesn't need further paps.     The following portions of the patient's history were reviewed and updated as appropriate:      Patient Active Problem List    Diagnosis Date Noted    Chronic venous insufficiency 2023    Tobacco dependence 2023    BARRETT (dyspnea on exertion) 2023    Hypertension 2018    Anxiety 2018    Arteriosclerosis of coronary artery 2017    Moderate episode of recurrent major depressive disorder ([address]) 2023    Prediabetes 2023    Depression with anxiety 2023    Elevated liver enzymes 2023    Insomnia 2023    Atypical chest pain 2023    Hypertrophic cardiomyopathy ([address]) 2023    Morbid obesity due to excess calories ([address]) 2017    Hyperlipidemia 2017      Past Medical History:   Diagnosis Date    Allergic rhinitis     Seasonal    CAD in native artery 2023    CHF (congestive heart failure) ([address]) 2014    Chronic back pain     Depression 1991    I have struggled with depression on and off since then    Headache 2001    Heart failure ([address])     Hypertension     Hypertrophic cardiomyopathy ([address])     Obesity 1994    Had a hard time losing weight after my first child    Pyelonephritis 2023      Past Surgical History:   Procedure Laterality Date     SECTION  1999. 2000    First was emergency and second was

## 2023-10-03 RX ORDER — LISINOPRIL 40 MG/1
40 TABLET ORAL DAILY
Qty: 90 TABLET | Refills: 3 | Status: SHIPPED | OUTPATIENT
Start: 2023-10-03

## 2023-10-03 RX ORDER — CLONAZEPAM 0.5 MG/1
0.25 TABLET ORAL NIGHTLY PRN
Qty: 20 TABLET | Refills: 0 | Status: SHIPPED | OUTPATIENT
Start: 2023-10-03 | End: 2023-11-12

## 2023-10-03 NOTE — ASSESSMENT & PLAN NOTE
trial of low-dose doxepin for sleep. If this does not help can try clonazepam, we discussed potential side effects and to not become dependent on this for sleep.

## 2023-10-23 NOTE — PROGRESS NOTES
rhythm, no rub/gallop appreciated; 3/6 systolic murmur heard over the precordium  Pulmonary:   clear to auscultation bilaterally, no respiratory distress  Abdomen:   soft, non-tender, non-distended  Ext:   No sig LE edema bilaterally  Skin:  warm and dry, no obvious rashes seen  Neuro:   no obvious sensory or motor deficits  Psychiatric:   normal mood and affect    Data Review:   Lab Results   Component Value Date    CHOL 142 07/28/2023    CHOL 130 01/24/2023     Lab Results   Component Value Date    TRIG 124 07/28/2023    TRIG 140 01/24/2023     Lab Results   Component Value Date    HDL 41 07/28/2023    HDL 45 01/24/2023     Lab Results   Component Value Date    LDLCALC 76.2 07/28/2023    LDLCALC 57 01/24/2023     Lab Results   Component Value Date    LABVLDL 24.8 (H) 07/28/2023    LABVLDL 28 (H) 01/24/2023     Lab Results   Component Value Date    CHOLHDLRATIO 3.5 07/28/2023    CHOLHDLRATIO 2.9 01/24/2023        Lab Results   Component Value Date/Time     07/28/2023 02:17 PM     05/24/2023 02:00 PM     03/09/2023 02:07 PM    K 4.1 07/28/2023 02:17 PM    K 5.0 05/24/2023 02:00 PM    K 4.0 03/09/2023 02:07 PM     07/28/2023 02:17 PM     05/24/2023 02:00 PM     03/09/2023 02:07 PM    CO2 25 07/28/2023 02:17 PM    CO2 31 05/24/2023 02:00 PM    CO2 30 03/09/2023 02:07 PM    BUN 11 07/28/2023 02:17 PM    BUN 13 05/24/2023 02:00 PM    BUN 20 03/09/2023 02:07 PM    CREATININE 0.90 07/28/2023 02:17 PM    CREATININE 0.80 05/24/2023 02:00 PM    CREATININE 0.90 03/09/2023 02:07 PM    GLUCOSE 91 07/28/2023 02:17 PM    GLUCOSE 80 05/24/2023 02:00 PM    GLUCOSE 88 03/09/2023 02:07 PM    CALCIUM 9.0 07/28/2023 02:17 PM    CALCIUM 9.1 05/24/2023 02:00 PM    CALCIUM 9.0 03/09/2023 02:07 PM         Lab Results   Component Value Date    ALT 48 07/28/2023    ALT 49 05/24/2023    ALT 76 (H) 03/09/2023    AST 25 07/28/2023    AST 25 05/24/2023    AST 47 (H) 03/09/2023        Assessment/Plan:   1.  CAD

## 2023-10-25 ENCOUNTER — OFFICE VISIT (OUTPATIENT)
Age: 48
End: 2023-10-25
Payer: COMMERCIAL

## 2023-10-25 VITALS
HEIGHT: 59 IN | DIASTOLIC BLOOD PRESSURE: 80 MMHG | HEART RATE: 66 BPM | WEIGHT: 252 LBS | BODY MASS INDEX: 50.8 KG/M2 | SYSTOLIC BLOOD PRESSURE: 136 MMHG

## 2023-10-25 DIAGNOSIS — I42.2 HYPERTROPHIC CARDIOMYOPATHY (HCC): ICD-10-CM

## 2023-10-25 DIAGNOSIS — I10 HYPERTENSION, UNSPECIFIED TYPE: ICD-10-CM

## 2023-10-25 DIAGNOSIS — I25.10 CAD IN NATIVE ARTERY: Primary | ICD-10-CM

## 2023-10-25 DIAGNOSIS — E66.01 MORBID OBESITY WITH BMI OF 50.0-59.9, ADULT (HCC): ICD-10-CM

## 2023-10-25 PROCEDURE — 99214 OFFICE O/P EST MOD 30 MIN: CPT | Performed by: INTERNAL MEDICINE

## 2023-10-25 PROCEDURE — 3079F DIAST BP 80-89 MM HG: CPT | Performed by: INTERNAL MEDICINE

## 2023-10-25 PROCEDURE — 3075F SYST BP GE 130 - 139MM HG: CPT | Performed by: INTERNAL MEDICINE

## 2023-12-29 RX ORDER — GABAPENTIN 100 MG/1
CAPSULE ORAL
Qty: 90 CAPSULE | Refills: 3 | Status: SHIPPED | OUTPATIENT
Start: 2023-12-29 | End: 2024-12-23

## 2024-01-23 DIAGNOSIS — G47.00 INSOMNIA, UNSPECIFIED TYPE: ICD-10-CM

## 2024-01-24 RX ORDER — CLONAZEPAM 0.5 MG/1
0.25 TABLET ORAL NIGHTLY PRN
Qty: 20 TABLET | Refills: 0 | Status: SHIPPED | OUTPATIENT
Start: 2024-01-24 | End: 2024-03-04

## 2024-03-07 ENCOUNTER — TELEPHONE (OUTPATIENT)
Dept: INTERNAL MEDICINE CLINIC | Facility: CLINIC | Age: 49
End: 2024-03-07

## 2024-03-07 DIAGNOSIS — E78.5 HYPERLIPIDEMIA, UNSPECIFIED HYPERLIPIDEMIA TYPE: ICD-10-CM

## 2024-03-07 DIAGNOSIS — I10 PRIMARY HYPERTENSION: ICD-10-CM

## 2024-03-07 DIAGNOSIS — R73.03 PREDIABETES: Primary | ICD-10-CM

## 2024-03-07 RX ORDER — ATORVASTATIN CALCIUM 40 MG/1
40 TABLET, FILM COATED ORAL EVERY EVENING
Qty: 90 TABLET | Refills: 3 | Status: SHIPPED | OUTPATIENT
Start: 2024-03-07

## 2024-03-07 NOTE — TELEPHONE ENCOUNTER
Please advise.     Pt last appt was 10/02/23. Rx wrote on 02/21/23 #90 with 0 refills. Pt is going to schedule a follow up. Rx pended.

## 2024-05-29 DIAGNOSIS — F41.9 ANXIETY: ICD-10-CM

## 2024-05-29 DIAGNOSIS — F33.1 MODERATE EPISODE OF RECURRENT MAJOR DEPRESSIVE DISORDER (HCC): ICD-10-CM

## 2024-05-29 RX ORDER — SERTRALINE HYDROCHLORIDE 100 MG/1
100 TABLET, FILM COATED ORAL DAILY
Qty: 90 TABLET | Refills: 1 | Status: SHIPPED | OUTPATIENT
Start: 2024-05-29

## 2024-08-23 NOTE — ED TRIAGE NOTES
Pt ambulatory to triage with CO HTN x 2 weeks. Reports BP medication changes by cardiology without improvement, reports intermittent flashes in sight. Denies unilateral weakness.
No